# Patient Record
Sex: FEMALE | Race: BLACK OR AFRICAN AMERICAN | HISPANIC OR LATINO | URBAN - METROPOLITAN AREA
[De-identification: names, ages, dates, MRNs, and addresses within clinical notes are randomized per-mention and may not be internally consistent; named-entity substitution may affect disease eponyms.]

---

## 2017-09-08 ENCOUNTER — EMERGENCY (EMERGENCY)
Facility: HOSPITAL | Age: 42
LOS: 1 days | Discharge: HOME | End: 2017-09-08
Attending: EMERGENCY MEDICINE | Admitting: EMERGENCY MEDICINE
Payer: SELF-PAY

## 2017-09-08 VITALS
OXYGEN SATURATION: 100 % | DIASTOLIC BLOOD PRESSURE: 90 MMHG | HEART RATE: 69 BPM | TEMPERATURE: 98 F | SYSTOLIC BLOOD PRESSURE: 163 MMHG | RESPIRATION RATE: 17 BRPM

## 2017-09-08 VITALS
HEART RATE: 87 BPM | OXYGEN SATURATION: 100 % | WEIGHT: 169.98 LBS | SYSTOLIC BLOOD PRESSURE: 165 MMHG | DIASTOLIC BLOOD PRESSURE: 106 MMHG | TEMPERATURE: 99 F | RESPIRATION RATE: 18 BRPM

## 2017-09-08 PROCEDURE — 73010 X-RAY EXAM OF SHOULDER BLADE: CPT

## 2017-09-08 PROCEDURE — 99284 EMERGENCY DEPT VISIT MOD MDM: CPT

## 2017-09-08 PROCEDURE — 73030 X-RAY EXAM OF SHOULDER: CPT | Mod: 26,RT

## 2017-09-08 PROCEDURE — 71020: CPT | Mod: 26

## 2017-09-08 PROCEDURE — 73010 X-RAY EXAM OF SHOULDER BLADE: CPT | Mod: 26

## 2017-09-08 PROCEDURE — 71046 X-RAY EXAM CHEST 2 VIEWS: CPT

## 2017-09-08 PROCEDURE — 73030 X-RAY EXAM OF SHOULDER: CPT

## 2017-09-08 PROCEDURE — 99284 EMERGENCY DEPT VISIT MOD MDM: CPT | Mod: 25

## 2017-09-08 RX ORDER — DIAZEPAM 5 MG
1 TABLET ORAL
Qty: 9 | Refills: 0
Start: 2017-09-08 | End: 2017-09-11

## 2017-09-08 RX ORDER — ACETAMINOPHEN 500 MG
650 TABLET ORAL ONCE
Qty: 0 | Refills: 0 | Status: COMPLETED | OUTPATIENT
Start: 2017-09-08 | End: 2017-09-08

## 2017-09-08 RX ADMIN — Medication 650 MILLIGRAM(S): at 12:10

## 2017-09-08 RX ADMIN — Medication 650 MILLIGRAM(S): at 11:10

## 2017-09-08 NOTE — ED PROVIDER NOTE - PLAN OF CARE
Please follow up with your PMD in 24-48 hours.     Please use 600mg Motrin (also called Advil or ibuprofen) every 6 hours as needed for pain/discomfort/swelling.  You can take this together with Tylenol 650mg  (also called acetaminophen) every 6 hours.  Both of these medications can be obtained over the counter.  Don't use more than 3500mg of Tylenol in any 24-hour period. Make sure your other prescription/over-the-counter medications don't contain any Tylenol so you don't take too much. If you have any stomach discomfort while taking Motrin, you can use TUMS or Pepcid or Zantac (these can all be bought without a prescription).     Please return to the ED should you have any new or worsening symptoms or have any new concerns.     Read all attached. Please follow up with your PMD in 24-48 hours regarding the breast lesion. This will require a follow up study.    Please return to the ED should you have any new or worsening symptoms or have any new concerns.     Please use 600mg Motrin (also called Advil or ibuprofen) every 6 hours as needed for pain/discomfort/swelling.  You can take this together with Tylenol 650mg  (also called acetaminophen) every 6 hours.  Both of these medications can be obtained over the counter.  Don't use more than 3500mg of Tylenol in any 24-hour period. Make sure your other prescription/over-the-counter medications don't contain any Tylenol so you don't take too much. If you have any stomach discomfort while taking Motrin, you can use TUMS or Pepcid or Zantac (these can all be bought without a prescription). You can also use a lidoderm patch for pain.    If your pain is not well controlled, you can take the muscle relaxant for pain. Do NOT drive while on this medication.     Read all attached.   Please use 600mg Motrin (also called Advil or ibuprofen) every 6 hours as needed for pain/discomfort/swelling.  You can take this together with Tylenol 650mg  (also called acetaminophen) every 6 hours.  Both of these medications can be obtained over the counter.  Don't use more than 3500mg of Tylenol in any 24-hour period. Make sure your other prescription/over-the-counter medications don't contain any Tylenol so you don't take too much. If you have any stomach discomfort while taking Motrin, you can use TUMS or Pepcid or Zantac (these can all be bought without a prescription).     Please return to the ED should you have any new or worsening symptoms or have any new concerns.     Read all attached. 1. Please follow up with your PMD in 24-48 hours.   2. Please return to the ED should you have any new or worsening symptoms or have any new concerns.   3. Please use 600mg Motrin (also called Advil or ibuprofen) every 6 hours as needed for pain/discomfort/swelling.  You can take this together with Tylenol 650mg  (also called acetaminophen) every 6 hours.  Both of these medications can be obtained over the counter.  Don't use more than 3500mg of Tylenol in any 24-hour period. Make sure your other prescription/over-the-counter medications don't contain any Tylenol so you don't take too much. If you have any stomach discomfort while taking Motrin, you can use TUMS or Pepcid or Zantac (these can all be bought without a prescription). You can also use a lidoderm patch.   4. Read all attached.

## 2017-09-08 NOTE — ED PROVIDER NOTE - NS ED ROS FT
Yuliana Adhikari, DO: ROS: denies HA, weakness, dizziness, nausea/vomiting, chest pain, SOB, abdominal pain, joint pain, neuro deficits, dysuria/hematuria, rash.

## 2017-09-08 NOTE — ED PROVIDER NOTE - PHYSICAL EXAMINATION
Yuliana Adhikari, DO:   Gen: Well appearing, NAD  Head: NCAT  HEENT: PERRL, MMM, normal conjunctiva, anicteric, neck supple  Lung: CTAB, no adventitious sounds  CV: RRR, no murmurs, rubs or gallops  Abd: soft, NTND, no rebound or guarding, no CVAT  MSK: No edema, full ROM of R shoulder, no visible or palpable  deformities, no spinal TTP   Neuro: No focal neurologic deficits. CN II-XII grossly intact. 5/5 strength and normal sensation in all extremities.  Skin: Warm and dry, no evidence of rash  Psych: normal mood and affect  Heme: radial pulses 2/3 intact
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
## 2017-09-08 NOTE — ED PROVIDER NOTE - CARE PLAN
Instructions for follow-up, activity and diet:	Please follow up with your PMD in 24-48 hours.     Please use 600mg Motrin (also called Advil or ibuprofen) every 6 hours as needed for pain/discomfort/swelling.  You can take this together with Tylenol 650mg  (also called acetaminophen) every 6 hours.  Both of these medications can be obtained over the counter.  Don't use more than 3500mg of Tylenol in any 24-hour period. Make sure your other prescription/over-the-counter medications don't contain any Tylenol so you don't take too much. If you have any stomach discomfort while taking Motrin, you can use TUMS or Pepcid or Zantac (these can all be bought without a prescription).     Please return to the ED should you have any new or worsening symptoms or have any new concerns.     Read all attached. Instructions for follow-up, activity and diet:	Please follow up with your PMD in 24-48 hours regarding the breast lesion. This will require a follow up study.    Please return to the ED should you have any new or worsening symptoms or have any new concerns.     Please use 600mg Motrin (also called Advil or ibuprofen) every 6 hours as needed for pain/discomfort/swelling.  You can take this together with Tylenol 650mg  (also called acetaminophen) every 6 hours.  Both of these medications can be obtained over the counter.  Don't use more than 3500mg of Tylenol in any 24-hour period. Make sure your other prescription/over-the-counter medications don't contain any Tylenol so you don't take too much. If you have any stomach discomfort while taking Motrin, you can use TUMS or Pepcid or Zantac (these can all be bought without a prescription). You can also use a lidoderm patch for pain.    If your pain is not well controlled, you can take the muscle relaxant for pain. Do NOT drive while on this medication.     Read all attached.   Please use 600mg Motrin (also called Advil or ibuprofen) every 6 hours as needed for pain/discomfort/swelling.  You can take this together with Tylenol 650mg  (also called acetaminophen) every 6 hours.  Both of these medications can be obtained over the counter.  Don't use more than 3500mg of Tylenol in any 24-hour period. Make sure your other prescription/over-the-counter medications don't contain any Tylenol so you don't take too much. If you have any stomach discomfort while taking Motrin, you can use TUMS or Pepcid or Zantac (these can all be bought without a prescription).     Please return to the ED should you have any new or worsening symptoms or have any new concerns.     Read all attached. Instructions for follow-up, activity and diet:	1. Please follow up with your PMD in 24-48 hours.   2. Please return to the ED should you have any new or worsening symptoms or have any new concerns.   3. Please use 600mg Motrin (also called Advil or ibuprofen) every 6 hours as needed for pain/discomfort/swelling.  You can take this together with Tylenol 650mg  (also called acetaminophen) every 6 hours.  Both of these medications can be obtained over the counter.  Don't use more than 3500mg of Tylenol in any 24-hour period. Make sure your other prescription/over-the-counter medications don't contain any Tylenol so you don't take too much. If you have any stomach discomfort while taking Motrin, you can use TUMS or Pepcid or Zantac (these can all be bought without a prescription). You can also use a lidoderm patch.   4. Read all attached. Principal Discharge DX:	Contusion of right shoulder, initial encounter  Instructions for follow-up, activity and diet:	1. Please follow up with your PMD in 24-48 hours.   2. Please return to the ED should you have any new or worsening symptoms or have any new concerns.   3. Please use 600mg Motrin (also called Advil or ibuprofen) every 6 hours as needed for pain/discomfort/swelling.  You can take this together with Tylenol 650mg  (also called acetaminophen) every 6 hours.  Both of these medications can be obtained over the counter.  Don't use more than 3500mg of Tylenol in any 24-hour period. Make sure your other prescription/over-the-counter medications don't contain any Tylenol so you don't take too much. If you have any stomach discomfort while taking Motrin, you can use TUMS or Pepcid or Zantac (these can all be bought without a prescription). You can also use a lidoderm patch.   4. Read all attached.
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
## 2019-08-28 PROBLEM — I10 ESSENTIAL (PRIMARY) HYPERTENSION: Chronic | Status: ACTIVE | Noted: 2017-09-08

## 2019-09-18 ENCOUNTER — APPOINTMENT (OUTPATIENT)
Dept: OBGYN | Facility: CLINIC | Age: 44
End: 2019-09-18

## 2019-09-18 PROBLEM — Z00.00 ENCOUNTER FOR PREVENTIVE HEALTH EXAMINATION: Status: ACTIVE | Noted: 2019-09-18

## 2019-10-02 ENCOUNTER — APPOINTMENT (OUTPATIENT)
Dept: OBGYN | Facility: CLINIC | Age: 44
End: 2019-10-02
Payer: COMMERCIAL

## 2019-10-02 VITALS
DIASTOLIC BLOOD PRESSURE: 95 MMHG | SYSTOLIC BLOOD PRESSURE: 149 MMHG | BODY MASS INDEX: 28.51 KG/M2 | WEIGHT: 177.38 LBS | HEART RATE: 69 BPM | HEIGHT: 66 IN

## 2019-10-02 DIAGNOSIS — Z86.79 PERSONAL HISTORY OF OTHER DISEASES OF THE CIRCULATORY SYSTEM: ICD-10-CM

## 2019-10-02 DIAGNOSIS — Z78.9 OTHER SPECIFIED HEALTH STATUS: ICD-10-CM

## 2019-10-02 DIAGNOSIS — Z82.49 FAMILY HISTORY OF ISCHEMIC HEART DISEASE AND OTHER DISEASES OF THE CIRCULATORY SYSTEM: ICD-10-CM

## 2019-10-02 DIAGNOSIS — Z12.39 ENCOUNTER FOR OTHER SCREENING FOR MALIGNANT NEOPLASM OF BREAST: ICD-10-CM

## 2019-10-02 DIAGNOSIS — N97.9 FEMALE INFERTILITY, UNSPECIFIED: ICD-10-CM

## 2019-10-02 PROCEDURE — 99204 OFFICE O/P NEW MOD 45 MIN: CPT

## 2019-10-02 NOTE — COUNSELING
[Breast Self Exam] : breast self exam [Nutrition] : nutrition [Vitamins/Supplements] : vitamins/supplements [Exercise] : exercise [Safe Sexual Practices] : safe sexual practices [STD (testing, results, tx)] : STD (testing, results, tx)

## 2019-10-02 NOTE — PHYSICAL EXAM
[Awake] : awake [Alert] : alert [Acute Distress] : no acute distress [Mass] : no breast mass [Nipple Discharge] : no nipple discharge [Axillary LAD] : no axillary lymphadenopathy [Soft] : soft [Oriented x3] : oriented to person, place, and time [Tender] : non tender [Normal] : uterus [No Bleeding] : there was no active vaginal bleeding [Enlarged ___ wks] : enlarged [unfilled] ~Uweeks [Uterine Adnexae] : were not tender and not enlarged

## 2019-10-02 NOTE — HISTORY OF PRESENT ILLNESS
[Lower-Rt-Q] : lower right quadrant [Suprapubic] : suprapubic area [Lower-Lt-Q] : left lower quadrant [Burning] : no burning [Continuous] : no continuous [Dull] : no dull [Stabbing] : stabbing [Sharp] : sharp [Fever] : no fever [4/10] : is 4/10 in severity [Vomiting] : no vomiting [Nausea] : no nausea [Vaginal Discharge] : no vaginal discharge [Diarrhea] : no diarrhea [Vaginal Bleeding] : vaginal bleeding [Pelvic Pressure] : pelvic pressure [Dysuria] : no dysuria [Dysmenorrhea] : dysmenorrhea [Pain with BMs] : no pain with bowel movements [Heat] : improved by heat [Rest] : improved by rest [Non-opiod Analgesic] : improved by non-opiod analgesic [Emotional Stress] : worsened by emotional stress [Activity] : worsened by activity [[place]] : not worsened by intercourse [Lifting] : worsened by lifting [Menses] : worsened by menses [Movement] : worsened by movement [Early Pregnancy] : not pregnant [Excessive Physical Activity] : no excessive physical activity [Urination] : not worsened by urination [New Sexual Partner] : no new sexual partners [Pelvic Trauma] : no pelvic trauma [STDs] : no sexually transmitted disease [Current IUD] : not currently using an IUD [Previous IUD] : no history of IUD use

## 2019-10-07 ENCOUNTER — FORM ENCOUNTER (OUTPATIENT)
Age: 44
End: 2019-10-07

## 2019-10-08 ENCOUNTER — APPOINTMENT (OUTPATIENT)
Dept: MAMMOGRAPHY | Facility: CLINIC | Age: 44
End: 2019-10-08
Payer: COMMERCIAL

## 2019-10-08 ENCOUNTER — OUTPATIENT (OUTPATIENT)
Dept: OUTPATIENT SERVICES | Facility: HOSPITAL | Age: 44
LOS: 1 days | End: 2019-10-08
Payer: COMMERCIAL

## 2019-10-08 DIAGNOSIS — Z12.31 ENCOUNTER FOR SCREENING MAMMOGRAM FOR MALIGNANT NEOPLASM OF BREAST: ICD-10-CM

## 2019-10-08 PROCEDURE — 77063 BREAST TOMOSYNTHESIS BI: CPT

## 2019-10-08 PROCEDURE — 77067 SCR MAMMO BI INCL CAD: CPT | Mod: 26

## 2019-10-08 PROCEDURE — 77067 SCR MAMMO BI INCL CAD: CPT

## 2019-10-08 PROCEDURE — 77063 BREAST TOMOSYNTHESIS BI: CPT | Mod: 26

## 2019-10-09 DIAGNOSIS — N63.20 UNSPECIFIED LUMP IN THE LEFT BREAST, UNSPECIFIED QUADRANT: ICD-10-CM

## 2019-10-09 DIAGNOSIS — R92.2 INCONCLUSIVE MAMMOGRAM: ICD-10-CM

## 2019-10-09 LAB
BASOPHILS # BLD AUTO: 0.04 K/UL
BASOPHILS NFR BLD AUTO: 0.8 %
C TRACH RRNA SPEC QL NAA+PROBE: NOT DETECTED
CYTOLOGY CVX/VAG DOC THIN PREP: NORMAL
EOSINOPHIL # BLD AUTO: 0.16 K/UL
EOSINOPHIL NFR BLD AUTO: 3.2 %
ESTIMATED AVERAGE GLUCOSE: 103 MG/DL
FSH SERPL-MCNC: 9.5 IU/L
HBA1C MFR BLD HPLC: 5.2 %
HCG SERPL QL: NEGATIVE
HCT VFR BLD CALC: 31.8 %
HGB BLD-MCNC: 9.9 G/DL
HPV DNA HIGH RISK: NEGATIVE
HPV DNA LOW RISK: NEGATIVE
IMM GRANULOCYTES NFR BLD AUTO: 0.2 %
LH SERPL-ACNC: 7.5 IU/L
LYMPHOCYTES # BLD AUTO: 2.26 K/UL
LYMPHOCYTES NFR BLD AUTO: 45.2 %
MAN DIFF?: NORMAL
MCHC RBC-ENTMCNC: 25.3 PG
MCHC RBC-ENTMCNC: 31.1 GM/DL
MCV RBC AUTO: 81.3 FL
MONOCYTES # BLD AUTO: 0.39 K/UL
MONOCYTES NFR BLD AUTO: 7.8 %
N GONORRHOEA RRNA SPEC QL NAA+PROBE: NOT DETECTED
NEUTROPHILS # BLD AUTO: 2.14 K/UL
NEUTROPHILS NFR BLD AUTO: 42.8 %
PAPP-A SERPL-ACNC: <1 MIU/ML
PLATELET # BLD AUTO: 396 K/UL
PROLACTIN SERPL-MCNC: 9.9 NG/ML
RBC # BLD: 3.91 M/UL
RBC # FLD: 16.2 %
SOURCE TP AMPLIFICATION: NORMAL
T3FREE SERPL-MCNC: 3.48 PG/ML
T4 FREE SERPL-MCNC: 1.2 NG/DL
TESTOST SERPL-MCNC: 14.4 NG/DL
TSH SERPL-ACNC: 1.8 UIU/ML
WBC # FLD AUTO: 5 K/UL

## 2019-10-17 ENCOUNTER — ASOB RESULT (OUTPATIENT)
Age: 44
End: 2019-10-17

## 2019-10-17 ENCOUNTER — APPOINTMENT (OUTPATIENT)
Dept: ANTEPARTUM | Facility: CLINIC | Age: 44
End: 2019-10-17
Payer: COMMERCIAL

## 2019-10-17 PROCEDURE — 76830 TRANSVAGINAL US NON-OB: CPT

## 2019-10-17 PROCEDURE — 76856 US EXAM PELVIC COMPLETE: CPT | Mod: 59

## 2019-10-27 ENCOUNTER — FORM ENCOUNTER (OUTPATIENT)
Age: 44
End: 2019-10-27

## 2019-10-28 ENCOUNTER — OUTPATIENT (OUTPATIENT)
Dept: OUTPATIENT SERVICES | Facility: HOSPITAL | Age: 44
LOS: 1 days | End: 2019-10-28
Payer: COMMERCIAL

## 2019-10-28 ENCOUNTER — APPOINTMENT (OUTPATIENT)
Dept: ULTRASOUND IMAGING | Facility: IMAGING CENTER | Age: 44
End: 2019-10-28
Payer: COMMERCIAL

## 2019-10-28 DIAGNOSIS — Z00.8 ENCOUNTER FOR OTHER GENERAL EXAMINATION: ICD-10-CM

## 2019-10-28 PROCEDURE — 76642 ULTRASOUND BREAST LIMITED: CPT

## 2019-10-28 PROCEDURE — 76642 ULTRASOUND BREAST LIMITED: CPT | Mod: 26,LT

## 2019-11-14 ENCOUNTER — APPOINTMENT (OUTPATIENT)
Dept: OBGYN | Facility: CLINIC | Age: 44
End: 2019-11-14
Payer: COMMERCIAL

## 2019-11-14 VITALS
HEART RATE: 82 BPM | SYSTOLIC BLOOD PRESSURE: 167 MMHG | WEIGHT: 178.13 LBS | BODY MASS INDEX: 28.75 KG/M2 | DIASTOLIC BLOOD PRESSURE: 97 MMHG

## 2019-11-14 PROCEDURE — 58558Z: CUSTOM

## 2019-11-14 NOTE — PROCEDURE
[Endometrial Biopsy] : Endometrial biopsy [Irregular Bleeding] : irregular uterine bleeding [Risks] : risks [Benefits] : benefits [Alternatives] : alternatives [Patient] : patient [Bleeding] : bleeding [Infection] : infection [Allergic Reaction] : allergic reaction [Uterine Perforation] : uterine perforation [Pain] : pain [CONSENT OBTAINED] : written consent was obtained prior to the procedure. [LMP ___] : LMP was [unfilled] [Neg Pregnancy Test] : a pregnancy test was negative [Betadine] : Betadine [Paracervical Block] : A paracervical block was performed using [Tenaculum] : a single toothed tenaculum [Without Epi] : without epinephrine [1%] : 1% [Sounded to ___ cm] : sounded to [unfilled] ~Ucm [Required Dilation] : required dilation [Anteverted] : anteverted [Pipelle] : a Pipelle endometrial suction curette [Sent to Histology] : the specimen was place in buffered formalin and sent for pathlogy [Moderate] : a moderate [Tolerated Well] : the patient tolerated the procedure well [No Complications] : there were no complications

## 2019-11-20 LAB — CORE LAB BIOPSY: NORMAL

## 2019-12-12 ENCOUNTER — APPOINTMENT (OUTPATIENT)
Dept: OBGYN | Facility: CLINIC | Age: 44
End: 2019-12-12
Payer: COMMERCIAL

## 2019-12-12 DIAGNOSIS — D25.0 INTRAMURAL LEIOMYOMA OF UTERUS: ICD-10-CM

## 2019-12-12 DIAGNOSIS — D25.2 INTRAMURAL LEIOMYOMA OF UTERUS: ICD-10-CM

## 2019-12-12 DIAGNOSIS — N92.0 EXCESSIVE AND FREQUENT MENSTRUATION WITH REGULAR CYCLE: ICD-10-CM

## 2019-12-12 DIAGNOSIS — D25.1 INTRAMURAL LEIOMYOMA OF UTERUS: ICD-10-CM

## 2019-12-12 PROCEDURE — 99214 OFFICE O/P EST MOD 30 MIN: CPT

## 2020-01-28 ENCOUNTER — OUTPATIENT (OUTPATIENT)
Dept: OUTPATIENT SERVICES | Facility: HOSPITAL | Age: 45
LOS: 1 days | End: 2020-01-28
Payer: COMMERCIAL

## 2020-01-28 VITALS
HEART RATE: 67 BPM | WEIGHT: 176.15 LBS | SYSTOLIC BLOOD PRESSURE: 167 MMHG | HEIGHT: 66 IN | RESPIRATION RATE: 16 BRPM | TEMPERATURE: 97 F | DIASTOLIC BLOOD PRESSURE: 98 MMHG

## 2020-01-28 DIAGNOSIS — Z29.9 ENCOUNTER FOR PROPHYLACTIC MEASURES, UNSPECIFIED: ICD-10-CM

## 2020-01-28 DIAGNOSIS — I10 ESSENTIAL (PRIMARY) HYPERTENSION: ICD-10-CM

## 2020-01-28 DIAGNOSIS — Z01.818 ENCOUNTER FOR OTHER PREPROCEDURAL EXAMINATION: ICD-10-CM

## 2020-01-28 DIAGNOSIS — D25.1 INTRAMURAL LEIOMYOMA OF UTERUS: ICD-10-CM

## 2020-01-28 LAB
ANION GAP SERPL CALC-SCNC: 12 MMOL/L — SIGNIFICANT CHANGE UP (ref 5–17)
BLD GP AB SCN SERPL QL: SIGNIFICANT CHANGE UP
BUN SERPL-MCNC: 10 MG/DL — SIGNIFICANT CHANGE UP (ref 8–20)
CALCIUM SERPL-MCNC: 9.7 MG/DL — SIGNIFICANT CHANGE UP (ref 8.6–10.2)
CHLORIDE SERPL-SCNC: 102 MMOL/L — SIGNIFICANT CHANGE UP (ref 98–107)
CO2 SERPL-SCNC: 25 MMOL/L — SIGNIFICANT CHANGE UP (ref 22–29)
CREAT SERPL-MCNC: 0.91 MG/DL — SIGNIFICANT CHANGE UP (ref 0.5–1.3)
GLUCOSE SERPL-MCNC: 91 MG/DL — SIGNIFICANT CHANGE UP (ref 70–99)
HBA1C BLD-MCNC: 4.8 % — SIGNIFICANT CHANGE UP (ref 4–5.6)
HCT VFR BLD CALC: 37.1 % — SIGNIFICANT CHANGE UP (ref 34.5–45)
HGB BLD-MCNC: 11.8 G/DL — SIGNIFICANT CHANGE UP (ref 11.5–15.5)
MCHC RBC-ENTMCNC: 27 PG — SIGNIFICANT CHANGE UP (ref 27–34)
MCHC RBC-ENTMCNC: 31.8 GM/DL — LOW (ref 32–36)
MCV RBC AUTO: 84.9 FL — SIGNIFICANT CHANGE UP (ref 80–100)
PLATELET # BLD AUTO: 281 K/UL — SIGNIFICANT CHANGE UP (ref 150–400)
POTASSIUM SERPL-MCNC: 4.7 MMOL/L — SIGNIFICANT CHANGE UP (ref 3.5–5.3)
POTASSIUM SERPL-SCNC: 4.7 MMOL/L — SIGNIFICANT CHANGE UP (ref 3.5–5.3)
RBC # BLD: 4.37 M/UL — SIGNIFICANT CHANGE UP (ref 3.8–5.2)
RBC # FLD: 18.5 % — HIGH (ref 10.3–14.5)
SODIUM SERPL-SCNC: 139 MMOL/L — SIGNIFICANT CHANGE UP (ref 135–145)
WBC # BLD: 3.13 K/UL — LOW (ref 3.8–10.5)
WBC # FLD AUTO: 3.13 K/UL — LOW (ref 3.8–10.5)

## 2020-01-28 PROCEDURE — 93005 ELECTROCARDIOGRAM TRACING: CPT

## 2020-01-28 PROCEDURE — G0463: CPT

## 2020-01-28 PROCEDURE — 93010 ELECTROCARDIOGRAM REPORT: CPT

## 2020-01-28 RX ORDER — CEFOTETAN DISODIUM 1 G
2 VIAL (EA) INJECTION ONCE
Refills: 0 | Status: COMPLETED | OUTPATIENT
Start: 2020-02-11 | End: 2020-02-11

## 2020-01-28 RX ORDER — ATENOLOL 25 MG/1
0 TABLET ORAL
Qty: 0 | Refills: 0 | DISCHARGE

## 2020-01-28 RX ORDER — SODIUM CHLORIDE 9 MG/ML
3 INJECTION INTRAMUSCULAR; INTRAVENOUS; SUBCUTANEOUS EVERY 8 HOURS
Refills: 0 | Status: DISCONTINUED | OUTPATIENT
Start: 2020-02-11 | End: 2020-02-13
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
## 2020-01-28 NOTE — H&P PST ADULT - NSICDXPROBLEM_GEN_ALL_CORE_FT
PROBLEM DIAGNOSES  Problem: Need for prophylactic measure  Assessment and Plan: Caprini Score 4 Moderate Risk, surgical team should consider VTE prophylaxis     Problem: Hypertension  Assessment and Plan: continue medications as instructed. Asked the patient to take the Blood pressure medication/ heart medication on DOP.     Problem: Fibroids, intramural  Assessment and Plan: Exploratory laparotomy abdominal myomectomies. Medical Clearance pending

## 2020-01-28 NOTE — H&P PST ADULT - HISTORY OF PRESENT ILLNESS
45 year old female with fibroids, who is scheduled for Exploratory laparotomy, she states that she has heavy menstrual cycle and has pain  for the last few months

## 2020-01-28 NOTE — H&P PST ADULT - ASSESSMENT
medications reviewed, instructions given on what medications to take and what not to take. Asked the patient to take the Blood pressure medication/ heart medication on DOP.   CAPRINI VTE 2.0 SCORE [CLOT updated 2019]    AGE RELATED RISK FACTORS                                                       MOBILITY RELATED FACTORS  [ ] Age 41-60 years                                            (1 Point)                    [ ] Bed rest                                                        (1 Point)  [ ] Age: 61-74 years                                           (2 Points)                  [ ] Plaster cast                                                   (2 Points)  [ ] Age= 75 years                                              (3 Points)                    [ ] Bed bound for more than 72 hours                 (2 Points)    DISEASE RELATED RISK FACTORS                                               GENDER SPECIFIC FACTORS  [ ] Edema in the lower extremities                       (1 Point)              [ ] Pregnancy                                                     (1 Point)  [ ] Varicose veins                                               (1 Point)                     [ ] Post-partum < 6 weeks                                   (1 Point)             [ ] BMI > 25 Kg/m2                                            (1 Point)                     [ ] Hormonal therapy  or oral contraception          (1 Point)                 [ ] Sepsis (in the previous month)                        (1 Point)               [ ] History of pregnancy complications                 (1 point)  [ ] Pneumonia or serious lung disease                                               [ ] Unexplained or recurrent                     (1 Point)           (in the previous month)                               (1 Point)  [ ] Abnormal pulmonary function test                     (1 Point)                 SURGERY RELATED RISK FACTORS  [ ] Acute myocardial infarction                              (1 Point)               [ ]  Section                                             (1 Point)  [ ] Congestive heart failure (in the previous month)  (1 Point)      [ ] Minor surgery                                                  (1 Point)   [ ] Inflammatory bowel disease                             (1 Point)               [ ] Arthroscopic surgery                                        (2 Points)  [ ] Central venous access                                      (2 Points)                [ ] General surgery lasting more than 45 minutes (2 points)  [ ] Malignancy- Present or previous                   (2 Points)                [ ] Elective arthroplasty                                         (5 points)    [ ] Stroke (in the previous month)                          (5 Points)                                                                                                                                                           HEMATOLOGY RELATED FACTORS                                                 TRAUMA RELATED RISK FACTORS  [ ] Prior episodes of VTE                                     (3 Points)                [ ] Fracture of the hip, pelvis, or leg                       (5 Points)  [ ] Positive family history for VTE                         (3 Points)             [ ] Acute spinal cord injury (in the previous month)  (5 Points)  [ ] Prothrombin 50543 A                                     (3 Points)               [ ] Paralysis  (less than 1 month)                             (5 Points)  [ ] Factor V Leiden                                             (3 Points)                  [ ] Multiple Trauma within 1 month                        (5 Points)  [ ] Lupus anticoagulants                                     (3 Points)                                                           [ ] Anticardiolipin antibodies                               (3 Points)                                                       [ ] High homocysteine in the blood                      (3 Points)                                             [ ] Other congenital or acquired thrombophilia      (3 Points)                                                [ ] Heparin induced thrombocytopenia                  (3 Points)                                     Total Score [          ]  OPIOID RISK TOOL    SHRUTHI EACH BOX THAT APPLIES AND ADD TOTALS AT THE END    FAMILY HISTORY OF SUBSTANCE ABUSE                 FEMALE         MALE                                                Alcohol                             [  ]1 pt          [  ]3pts                                               Illegal Drugs                     [  ]2 pts        [  ]3pts                                               Rx Drugs                           [  ]4 pts        [  ]4 pts    PERSONAL HISTORY OF SUBSTANCE ABUSE                                                                                          Alcohol                             [  ]3 pts       [  ]3 pts                                               Illegal Drugs                     [  ]4 pts        [  ]4 pts                                               Rx Drugs                           [  ]5 pts        [  ]5 pts    AGE BETWEEN 16-45 YEARS                                      [  ]1 pt         [  ]1 pt    HISTORY OF PREADOLESCENT   SEXUAL ABUSE                                                             [  ]3 pts        [  ]0pts    PSYCHOLOGICAL DISEASE                     ADD, OCD, Bipolar, Schizophrenia        [  ]2 pts         [  ]2 pts                      Depression                                               [  ]1 pt           [  ]1 pt           SCORING TOTAL   (add numbers and type here)              (  0)                                     A score of 3 or lower indicated LOW risk for future opioid abuse  A score of 4 to 7 indicated moderate risk for future opioid abuse  A score of 8 or higher indicates a high risk for opioid abuse medications reviewed, instructions given on what medications to take and what not to take. Asked the patient to take the Blood pressure medication/ heart medication on DOP.   CAPRINI VTE 2.0 SCORE [CLOT updated 2019]    AGE RELATED RISK FACTORS                                                       MOBILITY RELATED FACTORS  [x ] Age 41-60 years                                            (1 Point)                    [ ] Bed rest                                                        (1 Point)  [ ] Age: 61-74 years                                           (2 Points)                  [ ] Plaster cast                                                   (2 Points)  [ ] Age= 75 years                                              (3 Points)                    [ ] Bed bound for more than 72 hours                 (2 Points)    DISEASE RELATED RISK FACTORS                                               GENDER SPECIFIC FACTORS  [ ] Edema in the lower extremities                       (1 Point)              [ ] Pregnancy                                                     (1 Point)  [ ] Varicose veins                                               (1 Point)                     [ ] Post-partum < 6 weeks                                   (1 Point)             [x ] BMI > 25 Kg/m2                                            (1 Point)                     [ ] Hormonal therapy  or oral contraception          (1 Point)                 [ ] Sepsis (in the previous month)                        (1 Point)               [ ] History of pregnancy complications                 (1 point)  [ ] Pneumonia or serious lung disease                                               [ ] Unexplained or recurrent                     (1 Point)           (in the previous month)                               (1 Point)  [ ] Abnormal pulmonary function test                     (1 Point)                 SURGERY RELATED RISK FACTORS  [ ] Acute myocardial infarction                              (1 Point)               [ ]  Section                                             (1 Point)  [ ] Congestive heart failure (in the previous month)  (1 Point)      [ ] Minor surgery                                                  (1 Point)   [ ] Inflammatory bowel disease                             (1 Point)               [ ] Arthroscopic surgery                                        (2 Points)  [ ] Central venous access                                      (2 Points)                [x ] General surgery lasting more than 45 minutes (2 points)  [ ] Malignancy- Present or previous                   (2 Points)                [ ] Elective arthroplasty                                         (5 points)    [ ] Stroke (in the previous month)                          (5 Points)                                                                                                                                                           HEMATOLOGY RELATED FACTORS                                                 TRAUMA RELATED RISK FACTORS  [ ] Prior episodes of VTE                                     (3 Points)                [ ] Fracture of the hip, pelvis, or leg                       (5 Points)  [ ] Positive family history for VTE                         (3 Points)             [ ] Acute spinal cord injury (in the previous month)  (5 Points)  [ ] Prothrombin 94188 A                                     (3 Points)               [ ] Paralysis  (less than 1 month)                             (5 Points)  [ ] Factor V Leiden                                             (3 Points)                  [ ] Multiple Trauma within 1 month                        (5 Points)  [ ] Lupus anticoagulants                                     (3 Points)                                                           [ ] Anticardiolipin antibodies                               (3 Points)                                                       [ ] High homocysteine in the blood                      (3 Points)                                             [ ] Other congenital or acquired thrombophilia      (3 Points)                                                [ ] Heparin induced thrombocytopenia                  (3 Points)                                     Total Score [    4      ]  OPIOID RISK TOOL    SHRUTHI EACH BOX THAT APPLIES AND ADD TOTALS AT THE END    FAMILY HISTORY OF SUBSTANCE ABUSE                 FEMALE         MALE                                                Alcohol                             [  ]1 pt          [  ]3pts                                               Illegal Drugs                     [  ]2 pts        [  ]3pts                                               Rx Drugs                           [  ]4 pts        [  ]4 pts    PERSONAL HISTORY OF SUBSTANCE ABUSE                                                                                          Alcohol                             [  ]3 pts       [  ]3 pts                                               Illegal Drugs                     [  ]4 pts        [  ]4 pts                                               Rx Drugs                           [  ]5 pts        [  ]5 pts    AGE BETWEEN 16-45 YEARS                                      [  ]1 pt         [  ]1 pt    HISTORY OF PREADOLESCENT   SEXUAL ABUSE                                                             [  ]3 pts        [  ]0pts    PSYCHOLOGICAL DISEASE                     ADD, OCD, Bipolar, Schizophrenia        [  ]2 pts         [  ]2 pts                      Depression                                               [  ]1 pt           [  ]1 pt           SCORING TOTAL   (add numbers and type here)              (  0)                                     A score of 3 or lower indicated LOW risk for future opioid abuse  A score of 4 to 7 indicated moderate risk for future opioid abuse  A score of 8 or higher indicates a high risk for opioid abuse

## 2020-02-10 ENCOUNTER — TRANSCRIPTION ENCOUNTER (OUTPATIENT)
Age: 45
End: 2020-02-10

## 2020-02-11 ENCOUNTER — INPATIENT (INPATIENT)
Facility: HOSPITAL | Age: 45
LOS: 1 days | Discharge: HOME | DRG: 743 | End: 2020-02-13
Attending: OBSTETRICS & GYNECOLOGY | Admitting: OBSTETRICS & GYNECOLOGY
Payer: COMMERCIAL

## 2020-02-11 ENCOUNTER — RESULT REVIEW (OUTPATIENT)
Age: 45
End: 2020-02-11

## 2020-02-11 ENCOUNTER — APPOINTMENT (OUTPATIENT)
Dept: OBGYN | Facility: HOSPITAL | Age: 45
End: 2020-02-11

## 2020-02-11 VITALS
TEMPERATURE: 99 F | HEART RATE: 85 BPM | DIASTOLIC BLOOD PRESSURE: 97 MMHG | HEIGHT: 66 IN | SYSTOLIC BLOOD PRESSURE: 167 MMHG | WEIGHT: 176.15 LBS | OXYGEN SATURATION: 100 % | RESPIRATION RATE: 16 BRPM

## 2020-02-11 DIAGNOSIS — D25.1 INTRAMURAL LEIOMYOMA OF UTERUS: ICD-10-CM

## 2020-02-11 LAB
ABO RH CONFIRMATION: SIGNIFICANT CHANGE UP
ANISOCYTOSIS BLD QL: SLIGHT — SIGNIFICANT CHANGE UP
BASOPHILS # BLD AUTO: 0 K/UL — SIGNIFICANT CHANGE UP (ref 0–0.2)
BASOPHILS NFR BLD AUTO: 0 % — SIGNIFICANT CHANGE UP (ref 0–2)
ELLIPTOCYTES BLD QL SMEAR: SLIGHT — SIGNIFICANT CHANGE UP
EOSINOPHIL # BLD AUTO: 0 K/UL — SIGNIFICANT CHANGE UP (ref 0–0.5)
EOSINOPHIL NFR BLD AUTO: 0 % — SIGNIFICANT CHANGE UP (ref 0–6)
GLUCOSE BLDC GLUCOMTR-MCNC: 94 MG/DL — SIGNIFICANT CHANGE UP (ref 70–99)
HCT VFR BLD CALC: 32.3 % — LOW (ref 34.5–45)
HGB BLD-MCNC: 10.8 G/DL — LOW (ref 11.5–15.5)
LYMPHOCYTES # BLD AUTO: 0.85 K/UL — LOW (ref 1–3.3)
LYMPHOCYTES # BLD AUTO: 5.2 % — LOW (ref 13–44)
MACROCYTES BLD QL: SLIGHT — SIGNIFICANT CHANGE UP
MANUAL SMEAR VERIFICATION: SIGNIFICANT CHANGE UP
MCHC RBC-ENTMCNC: 28.8 PG — SIGNIFICANT CHANGE UP (ref 27–34)
MCHC RBC-ENTMCNC: 33.4 GM/DL — SIGNIFICANT CHANGE UP (ref 32–36)
MCV RBC AUTO: 86.1 FL — SIGNIFICANT CHANGE UP (ref 80–100)
MONOCYTES # BLD AUTO: 0.85 K/UL — SIGNIFICANT CHANGE UP (ref 0–0.9)
MONOCYTES NFR BLD AUTO: 5.2 % — SIGNIFICANT CHANGE UP (ref 2–14)
NEUTROPHILS # BLD AUTO: 14.63 K/UL — HIGH (ref 1.8–7.4)
NEUTROPHILS NFR BLD AUTO: 87.8 % — HIGH (ref 43–77)
NEUTS BAND # BLD: 1.8 % — SIGNIFICANT CHANGE UP (ref 0–8)
PLAT MORPH BLD: NORMAL — SIGNIFICANT CHANGE UP
PLATELET # BLD AUTO: 178 K/UL — SIGNIFICANT CHANGE UP (ref 150–400)
POIKILOCYTOSIS BLD QL AUTO: SLIGHT — SIGNIFICANT CHANGE UP
RBC # BLD: 3.75 M/UL — LOW (ref 3.8–5.2)
RBC # FLD: 16.2 % — HIGH (ref 10.3–14.5)
RBC BLD AUTO: ABNORMAL
WBC # BLD: 16.33 K/UL — HIGH (ref 3.8–10.5)
WBC # FLD AUTO: 16.33 K/UL — HIGH (ref 3.8–10.5)

## 2020-02-11 PROCEDURE — 88305 TISSUE EXAM BY PATHOLOGIST: CPT | Mod: 26

## 2020-02-11 PROCEDURE — 58546 LAPARO-MYOMECTOMY COMPLEX: CPT

## 2020-02-11 RX ORDER — ONDANSETRON 8 MG/1
4 TABLET, FILM COATED ORAL EVERY 4 HOURS
Refills: 0 | Status: DISCONTINUED | OUTPATIENT
Start: 2020-02-11 | End: 2020-02-13

## 2020-02-11 RX ORDER — HYDROMORPHONE HYDROCHLORIDE 2 MG/ML
0.5 INJECTION INTRAMUSCULAR; INTRAVENOUS; SUBCUTANEOUS
Refills: 0 | Status: DISCONTINUED | OUTPATIENT
Start: 2020-02-11 | End: 2020-02-11

## 2020-02-11 RX ORDER — OXYCODONE AND ACETAMINOPHEN 5; 325 MG/1; MG/1
2 TABLET ORAL EVERY 4 HOURS
Refills: 0 | Status: DISCONTINUED | OUTPATIENT
Start: 2020-02-11 | End: 2020-02-13

## 2020-02-11 RX ORDER — SENNA PLUS 8.6 MG/1
2 TABLET ORAL AT BEDTIME
Refills: 0 | Status: DISCONTINUED | OUTPATIENT
Start: 2020-02-11 | End: 2020-02-13

## 2020-02-11 RX ORDER — KETOROLAC TROMETHAMINE 30 MG/ML
30 SYRINGE (ML) INJECTION EVERY 6 HOURS
Refills: 0 | Status: DISCONTINUED | OUTPATIENT
Start: 2020-02-11 | End: 2020-02-12

## 2020-02-11 RX ORDER — ONDANSETRON 8 MG/1
4 TABLET, FILM COATED ORAL ONCE
Refills: 0 | Status: DISCONTINUED | OUTPATIENT
Start: 2020-02-11 | End: 2020-02-11

## 2020-02-11 RX ORDER — ATENOLOL 25 MG/1
100 TABLET ORAL DAILY
Refills: 0 | Status: DISCONTINUED | OUTPATIENT
Start: 2020-02-11 | End: 2020-02-13

## 2020-02-11 RX ORDER — SODIUM CHLORIDE 9 MG/ML
1000 INJECTION, SOLUTION INTRAVENOUS
Refills: 0 | Status: DISCONTINUED | OUTPATIENT
Start: 2020-02-11 | End: 2020-02-11

## 2020-02-11 RX ORDER — SODIUM CHLORIDE 9 MG/ML
1000 INJECTION, SOLUTION INTRAVENOUS
Refills: 0 | Status: DISCONTINUED | OUTPATIENT
Start: 2020-02-11 | End: 2020-02-12

## 2020-02-11 RX ORDER — CEFAZOLIN SODIUM 1 G
2000 VIAL (EA) INJECTION EVERY 8 HOURS
Refills: 0 | Status: COMPLETED | OUTPATIENT
Start: 2020-02-11 | End: 2020-02-12

## 2020-02-11 RX ORDER — OXYCODONE AND ACETAMINOPHEN 5; 325 MG/1; MG/1
1 TABLET ORAL EVERY 4 HOURS
Refills: 0 | Status: DISCONTINUED | OUTPATIENT
Start: 2020-02-11 | End: 2020-02-13

## 2020-02-11 RX ORDER — FENTANYL CITRATE 50 UG/ML
30 INJECTION INTRAVENOUS
Refills: 0 | Status: DISCONTINUED | OUTPATIENT
Start: 2020-02-11 | End: 2020-02-12

## 2020-02-11 RX ADMIN — HYDROMORPHONE HYDROCHLORIDE 0.5 MILLIGRAM(S): 2 INJECTION INTRAMUSCULAR; INTRAVENOUS; SUBCUTANEOUS at 15:05

## 2020-02-11 RX ADMIN — SODIUM CHLORIDE 3 MILLILITER(S): 9 INJECTION INTRAMUSCULAR; INTRAVENOUS; SUBCUTANEOUS at 20:30

## 2020-02-11 RX ADMIN — Medication 100 GRAM(S): at 12:35

## 2020-02-11 RX ADMIN — Medication 30 MILLIGRAM(S): at 18:04

## 2020-02-11 RX ADMIN — FENTANYL CITRATE 30 MILLILITER(S): 50 INJECTION INTRAVENOUS at 14:35

## 2020-02-11 RX ADMIN — FENTANYL CITRATE 30 MILLILITER(S): 50 INJECTION INTRAVENOUS at 16:21

## 2020-02-11 RX ADMIN — Medication 100 MILLIGRAM(S): at 20:28

## 2020-02-11 RX ADMIN — HYDROMORPHONE HYDROCHLORIDE 0.5 MILLIGRAM(S): 2 INJECTION INTRAMUSCULAR; INTRAVENOUS; SUBCUTANEOUS at 14:35

## 2020-02-11 RX ADMIN — FENTANYL CITRATE 30 MILLILITER(S): 50 INJECTION INTRAVENOUS at 17:06

## 2020-02-11 NOTE — BRIEF OPERATIVE NOTE - NSICDXBRIEFPROCEDURE_GEN_ALL_CORE_FT
PROCEDURES:  Myomectomy, uterine, abdominal approach, 5 or more leiomyomata, total weight greater than 250 grams 11-Feb-2020 15:55:58  Garcia Goldberg

## 2020-02-11 NOTE — CHART NOTE - NSCHARTNOTEFT_GEN_A_CORE
Patient seen and examined at bedside for post-operative check. She is s/p abdominal myomectomy. She received 2 units of packed red blood cells intraoperatively due to blood loss. She states that she feels ok at this time. She reports good pain management with the PCA. She denies nausea and vomiting. She has not yet attempted PO fluids or food.    Vital Signs Last 24 Hrs  T(C): 36.6 (11 Feb 2020 16:49), Max: 37.2 (11 Feb 2020 11:02)  T(F): 97.9 (11 Feb 2020 16:49), Max: 99 (11 Feb 2020 11:02)  HR: 75 (11 Feb 2020 16:49) (64 - 85)  BP: 115/79 (11 Feb 2020 16:49) (109/81 - 185/91)  BP(mean): 85 (11 Feb 2020 15:40) (85 - 123)  RR: 16 (11 Feb 2020 16:49) (12 - 18)  SpO2: 100% (11 Feb 2020 16:49) (99% - 100%)    Gen: NAD, AOx3, sleepy  CV: RRR  Pulm: CTAB  Abd: soft, nondistended, appropriately tender, bandage clean, dry and intact  : vaginal bleeding noted, schneider catheter in place draining clear yellow urine  Ext: SCDs in place, no edema noted    - Patient doing well post-operatively  - PCA and toradol for pain control, with percocet for breakthrough pain  - Zofran prn for nausea  - Post-operative/ post-transfusion CBC scheduled for 1800  - Repeat labs in AM  - Schneider to come out in AM  - Continue routine post-operative care

## 2020-02-12 ENCOUNTER — TRANSCRIPTION ENCOUNTER (OUTPATIENT)
Age: 45
End: 2020-02-12

## 2020-02-12 DIAGNOSIS — Z98.890 OTHER SPECIFIED POSTPROCEDURAL STATES: ICD-10-CM

## 2020-02-12 LAB
ANISOCYTOSIS BLD QL: SLIGHT — SIGNIFICANT CHANGE UP
BASOPHILS # BLD AUTO: 0 K/UL — SIGNIFICANT CHANGE UP (ref 0–0.2)
BASOPHILS NFR BLD AUTO: 0 % — SIGNIFICANT CHANGE UP (ref 0–2)
EOSINOPHIL # BLD AUTO: 0 K/UL — SIGNIFICANT CHANGE UP (ref 0–0.5)
EOSINOPHIL NFR BLD AUTO: 0 % — SIGNIFICANT CHANGE UP (ref 0–6)
HCT VFR BLD CALC: 27 % — LOW (ref 34.5–45)
HGB BLD-MCNC: 8.9 G/DL — LOW (ref 11.5–15.5)
HYPOCHROMIA BLD QL: SLIGHT — SIGNIFICANT CHANGE UP
LYMPHOCYTES # BLD AUTO: 0.6 K/UL — LOW (ref 1–3.3)
LYMPHOCYTES # BLD AUTO: 3.5 % — LOW (ref 13–44)
MANUAL SMEAR VERIFICATION: SIGNIFICANT CHANGE UP
MCHC RBC-ENTMCNC: 28.2 PG — SIGNIFICANT CHANGE UP (ref 27–34)
MCHC RBC-ENTMCNC: 33 GM/DL — SIGNIFICANT CHANGE UP (ref 32–36)
MCV RBC AUTO: 85.4 FL — SIGNIFICANT CHANGE UP (ref 80–100)
MONOCYTES # BLD AUTO: 0.73 K/UL — SIGNIFICANT CHANGE UP (ref 0–0.9)
MONOCYTES NFR BLD AUTO: 4.3 % — SIGNIFICANT CHANGE UP (ref 2–14)
NEUTROPHILS # BLD AUTO: 15.7 K/UL — HIGH (ref 1.8–7.4)
NEUTROPHILS NFR BLD AUTO: 80.9 % — HIGH (ref 43–77)
NEUTS BAND # BLD: 11.3 % — HIGH (ref 0–8)
OVALOCYTES BLD QL SMEAR: SLIGHT — SIGNIFICANT CHANGE UP
PLAT MORPH BLD: NORMAL — SIGNIFICANT CHANGE UP
PLATELET # BLD AUTO: 169 K/UL — SIGNIFICANT CHANGE UP (ref 150–400)
POIKILOCYTOSIS BLD QL AUTO: SLIGHT — SIGNIFICANT CHANGE UP
POLYCHROMASIA BLD QL SMEAR: SLIGHT — SIGNIFICANT CHANGE UP
RBC # BLD: 3.16 M/UL — LOW (ref 3.8–5.2)
RBC # FLD: 16.3 % — HIGH (ref 10.3–14.5)
RBC BLD AUTO: ABNORMAL
SCHISTOCYTES BLD QL AUTO: SLIGHT — SIGNIFICANT CHANGE UP
WBC # BLD: 17.03 K/UL — HIGH (ref 3.8–10.5)
WBC # FLD AUTO: 17.03 K/UL — HIGH (ref 3.8–10.5)

## 2020-02-12 RX ORDER — IBUPROFEN 200 MG
1 TABLET ORAL
Qty: 28 | Refills: 0
Start: 2020-02-12

## 2020-02-12 RX ORDER — IBUPROFEN 200 MG
600 TABLET ORAL EVERY 6 HOURS
Refills: 0 | Status: DISCONTINUED | OUTPATIENT
Start: 2020-02-12 | End: 2020-02-13

## 2020-02-12 RX ORDER — ATENOLOL 25 MG/1
1 TABLET ORAL
Qty: 0 | Refills: 0 | DISCHARGE

## 2020-02-12 RX ORDER — SIMETHICONE 80 MG/1
80 TABLET, CHEWABLE ORAL EVERY 6 HOURS
Refills: 0 | Status: DISCONTINUED | OUTPATIENT
Start: 2020-02-12 | End: 2020-02-13

## 2020-02-12 RX ADMIN — OXYCODONE AND ACETAMINOPHEN 2 TABLET(S): 5; 325 TABLET ORAL at 22:30

## 2020-02-12 RX ADMIN — SODIUM CHLORIDE 3 MILLILITER(S): 9 INJECTION INTRAMUSCULAR; INTRAVENOUS; SUBCUTANEOUS at 13:14

## 2020-02-12 RX ADMIN — OXYCODONE AND ACETAMINOPHEN 2 TABLET(S): 5; 325 TABLET ORAL at 09:20

## 2020-02-12 RX ADMIN — SODIUM CHLORIDE 3 MILLILITER(S): 9 INJECTION INTRAMUSCULAR; INTRAVENOUS; SUBCUTANEOUS at 21:42

## 2020-02-12 RX ADMIN — OXYCODONE AND ACETAMINOPHEN 2 TABLET(S): 5; 325 TABLET ORAL at 21:41

## 2020-02-12 RX ADMIN — SODIUM CHLORIDE 3 MILLILITER(S): 9 INJECTION INTRAMUSCULAR; INTRAVENOUS; SUBCUTANEOUS at 06:00

## 2020-02-12 RX ADMIN — Medication 10 MILLIGRAM(S): at 16:16

## 2020-02-12 RX ADMIN — Medication 600 MILLIGRAM(S): at 12:24

## 2020-02-12 RX ADMIN — Medication 600 MILLIGRAM(S): at 18:49

## 2020-02-12 RX ADMIN — OXYCODONE AND ACETAMINOPHEN 2 TABLET(S): 5; 325 TABLET ORAL at 08:37

## 2020-02-12 RX ADMIN — Medication 30 MILLIGRAM(S): at 01:00

## 2020-02-12 RX ADMIN — Medication 600 MILLIGRAM(S): at 18:18

## 2020-02-12 RX ADMIN — Medication 30 MILLIGRAM(S): at 06:15

## 2020-02-12 RX ADMIN — SIMETHICONE 80 MILLIGRAM(S): 80 TABLET, CHEWABLE ORAL at 05:59

## 2020-02-12 RX ADMIN — Medication 30 MILLIGRAM(S): at 01:15

## 2020-02-12 RX ADMIN — Medication 100 MILLIGRAM(S): at 06:00

## 2020-02-12 RX ADMIN — Medication 600 MILLIGRAM(S): at 13:00

## 2020-02-12 RX ADMIN — Medication 5 MILLIGRAM(S): at 14:25

## 2020-02-12 RX ADMIN — Medication 30 MILLIGRAM(S): at 05:59

## 2020-02-12 NOTE — DISCHARGE NOTE PROVIDER - NSDCFUSCHEDAPPT_GEN_ALL_CORE_FT
SAINTONGEAGENORE, ROSELAURE ; 02/19/2020 ; NPP OB/ E [facility]

## 2020-02-12 NOTE — PROGRESS NOTE ADULT - ASSESSMENT
A/P:   46yo now POD#1 s/p abdominal myomectomy requiring transfusion of 2 units packed red blood cells intraoperatively  Gen: VSS  Neuro: Pain controlled on PCA. Will discontinue PCA and will transition to PO medications  CV: Will continue BP meds with hold precautions  Pulm: incentive spirometer use encouraged  GI: Bowel sounds normal, will attempt regular diet this AM  : Tran to be removed this AM with TOV to follow  Heme: AM CBC showing drop in hemoglobin, likely equilibration after surgery and blood transfusion, will repeat CBC tomorrow AM  DVT ppx: ambulation encouraged, SCDs when in bed  Dispo: will discuss with attending

## 2020-02-12 NOTE — PROGRESS NOTE ADULT - SUBJECTIVE AND OBJECTIVE BOX
ROSELAURE SAINTONGEAGENORE is a 46yo now POD#1 s/p abdominal myomectomy requiring transfusion of 2 units packed red blood cells intraoperatively    S:    Patient was seen and examined at bedside. Pain is controlled with PCA medication   She is tolerating PO fluids, but has not yet attempted a regular diet  She reports mild gas discomfort  Not yet ambulating  - flatus/-BM    O:   T(C): 36.9 (02-12-20 @ 05:48), Max: 37.2 (02-11-20 @ 11:02)  HR: 99 (02-12-20 @ 05:48) (64 - 99)  BP: 114/73 (02-12-20 @ 05:48) (103/69 - 185/91)  RR: 18 (02-12-20 @ 05:48) (12 - 18)  SpO2: 99% (02-12-20 @ 05:48) (95% - 100%)    Gen: NAD, AOx3  CV: RRR  Pulm: CTAB  Abdomen: soft, minimally distended, appropriately tender, + BS   Incision: clean dry and intact with staples in place  : + vaginal bleeding, schneider in place draining clear yellow urine  Extrem: no calf tenderness or edema, SCDs in place     Labs:                         8.9    17.03 )-----------( 169      ( 12 Feb 2020 05:51 )             27.0       02-11-20 @ 07:01  -  02-12-20 @ 06:16  --------------------------------------------------------  IN: 1500 mL / OUT: 535 mL / NET: 965 mL

## 2020-02-12 NOTE — DISCHARGE NOTE PROVIDER - HOSPITAL COURSE
Patient was admitted after her abdominal myomectomy. After an uneventful recovery, she was transferred to the floor where her stay was uncomplicated. Upon discharge she was tolerating a regular diet, ambulating without difficulty, voiding spontaneously, passing flatus and her pain was well controlled with PO pain medication.

## 2020-02-12 NOTE — DISCHARGE NOTE PROVIDER - NSDCMRMEDTOKEN_GEN_ALL_CORE_FT
Garlic oral tablet: orally once a day  ibuprofen 600 mg oral tablet: 1 tab(s) orally every 6 hours, As Needed -for moderate pain   Multiple Vitamins oral capsule: 1 cap(s) orally once a day  oxycodone-acetaminophen 5 mg-325 mg oral tablet: 1 tab(s) orally every 6 hours -Moderate Pain (4 - 6) - for severe pain MDD:4   Vitamin D3: orally once a day

## 2020-02-12 NOTE — DISCHARGE NOTE PROVIDER - CARE PROVIDER_API CALL
Garcia Goldberg)  Obstetrics and Gynecology  [address], [address]  [place] [address]  Phone: [phone]  Fax: [phone]  Follow Up Time:

## 2020-02-13 ENCOUNTER — TRANSCRIPTION ENCOUNTER (OUTPATIENT)
Age: 45
End: 2020-02-13

## 2020-02-13 VITALS
RESPIRATION RATE: 18 BRPM | OXYGEN SATURATION: 93 % | TEMPERATURE: 97 F | HEART RATE: 113 BPM | SYSTOLIC BLOOD PRESSURE: 122 MMHG | DIASTOLIC BLOOD PRESSURE: 81 MMHG

## 2020-02-13 LAB
HCT VFR BLD CALC: 20.8 % — CRITICAL LOW (ref 34.5–45)
HGB BLD-MCNC: 7.1 G/DL — LOW (ref 11.5–15.5)
MCHC RBC-ENTMCNC: 29 PG — SIGNIFICANT CHANGE UP (ref 27–34)
MCHC RBC-ENTMCNC: 34.1 GM/DL — SIGNIFICANT CHANGE UP (ref 32–36)
MCV RBC AUTO: 84.9 FL — SIGNIFICANT CHANGE UP (ref 80–100)
PLATELET # BLD AUTO: 145 K/UL — LOW (ref 150–400)
RBC # BLD: 2.45 M/UL — LOW (ref 3.8–5.2)
RBC # FLD: 16.2 % — HIGH (ref 10.3–14.5)
WBC # BLD: 16.35 K/UL — HIGH (ref 3.8–10.5)
WBC # FLD AUTO: 16.35 K/UL — HIGH (ref 3.8–10.5)

## 2020-02-13 PROCEDURE — 82962 GLUCOSE BLOOD TEST: CPT

## 2020-02-13 PROCEDURE — 85027 COMPLETE CBC AUTOMATED: CPT

## 2020-02-13 PROCEDURE — 88305 TISSUE EXAM BY PATHOLOGIST: CPT

## 2020-02-13 PROCEDURE — P9016: CPT

## 2020-02-13 PROCEDURE — 36430 TRANSFUSION BLD/BLD COMPNT: CPT

## 2020-02-13 PROCEDURE — 36415 COLL VENOUS BLD VENIPUNCTURE: CPT

## 2020-02-13 RX ORDER — DOCUSATE SODIUM 100 MG
1 CAPSULE ORAL
Qty: 30 | Refills: 0
Start: 2020-02-13

## 2020-02-13 RX ORDER — FERROUS SULFATE 325(65) MG
1 TABLET ORAL
Qty: 60 | Refills: 0
Start: 2020-02-13

## 2020-02-13 RX ORDER — ASCORBIC ACID 60 MG
500 TABLET,CHEWABLE ORAL
Refills: 0 | Status: DISCONTINUED | OUTPATIENT
Start: 2020-02-13 | End: 2020-02-13

## 2020-02-13 RX ORDER — FERROUS SULFATE 325(65) MG
325 TABLET ORAL
Refills: 0 | Status: DISCONTINUED | OUTPATIENT
Start: 2020-02-13 | End: 2020-02-13

## 2020-02-13 RX ORDER — ASCORBIC ACID 60 MG
1 TABLET,CHEWABLE ORAL
Qty: 60 | Refills: 0
Start: 2020-02-13

## 2020-02-13 RX ADMIN — Medication 600 MILLIGRAM(S): at 01:04

## 2020-02-13 RX ADMIN — OXYCODONE AND ACETAMINOPHEN 2 TABLET(S): 5; 325 TABLET ORAL at 10:27

## 2020-02-13 RX ADMIN — OXYCODONE AND ACETAMINOPHEN 2 TABLET(S): 5; 325 TABLET ORAL at 11:26

## 2020-02-13 RX ADMIN — Medication 600 MILLIGRAM(S): at 06:45

## 2020-02-13 RX ADMIN — SODIUM CHLORIDE 3 MILLILITER(S): 9 INJECTION INTRAMUSCULAR; INTRAVENOUS; SUBCUTANEOUS at 06:01

## 2020-02-13 RX ADMIN — Medication 600 MILLIGRAM(S): at 06:01

## 2020-02-13 RX ADMIN — Medication 600 MILLIGRAM(S): at 02:00

## 2020-02-13 NOTE — PROGRESS NOTE ADULT - ASSESSMENT
A/P:   44yo now POD#2 s/p abdominal myomectomy  Gen: VSS  Neuro: Pain well controlled on current regimen  CV: Continue home antihypertensives with hold parameters  Pulm: incentive spirometer use encouraged  GI: Bowel sounds and function normal, tolerating PO diet  : Tran removed, voiding spontaneously  Heme: AM labs pending  DVT ppx: ambulation encouraged, SCDs when in bed  Dispo: stable for discharge home today

## 2020-02-13 NOTE — PROGRESS NOTE ADULT - SUBJECTIVE AND OBJECTIVE BOX
ROSELAURE SAINTONGEAGENORE is a 46yo now POD#2 s/p abdominal myomectomy    S:    Patient was seen and examined at bedside. Pain is controlled with PRN medication   Tolerating regular diet, denies N/V.   Ambulating without difficulty.   + flatus/+BM/+ voiding    O:   T(C): 36.8 (02-13-20 @ 05:10), Max: 36.9 (02-12-20 @ 09:25)  HR: 108 (02-13-20 @ 05:10) (85 - 108)  BP: 106/68 (02-13-20 @ 05:10) (100/65 - 127/83)  RR: 18 (02-13-20 @ 05:10) (15 - 18)  SpO2: 93% (02-13-20 @ 05:10) (91% - 96%)    Gen: NAD, AOx3  CV: RRR  Pulm: CTAB  Abdomen: soft, nondistended, appropriately tender, + BS   Incision: clean dry and intact with staples in place  Extrem: no calf tenderness or edema     Labs:                         8.9    17.03 )-----------( 169      ( 12 Feb 2020 05:51 )             27.0   AM CBC pending    02-11-20 @ 07:01  -  02-12-20 @ 07:00  --------------------------------------------------------  IN: 1500 mL / OUT: 635 mL / NET: 865 mL    02-12-20 @ 07:01  -  02-13-20 @ 06:10  --------------------------------------------------------  IN: 0 mL / OUT: 800 mL / NET: -800 mL

## 2020-02-13 NOTE — DISCHARGE NOTE NURSING/CASE MANAGEMENT/SOCIAL WORK - PATIENT PORTAL LINK FT
You can access the FollowMyHealth Patient Portal offered by [facility] by registering at the following website: [email or web address]. By joining [employer]’s FollowMyHealth portal, you will also be able to view your health information using other applications (apps) compatible with our system.

## 2020-02-17 ENCOUNTER — INPATIENT (INPATIENT)
Facility: HOSPITAL | Age: 45
LOS: 6 days | Discharge: HOME | DRG: 299 | End: 2020-02-24
Attending: INTERNAL MEDICINE | Admitting: INTERNAL MEDICINE
Payer: COMMERCIAL

## 2020-02-17 VITALS
HEART RATE: 120 BPM | WEIGHT: 169.98 LBS | HEIGHT: 66 IN | OXYGEN SATURATION: 80 % | SYSTOLIC BLOOD PRESSURE: 163 MMHG | DIASTOLIC BLOOD PRESSURE: 102 MMHG | RESPIRATION RATE: 30 BRPM

## 2020-02-17 DIAGNOSIS — Z98.890 OTHER SPECIFIED POSTPROCEDURAL STATES: Chronic | ICD-10-CM

## 2020-02-17 DIAGNOSIS — J96.01 ACUTE RESPIRATORY FAILURE WITH HYPOXIA: ICD-10-CM

## 2020-02-17 DIAGNOSIS — I26.99 OTHER PULMONARY EMBOLISM WITHOUT ACUTE COR PULMONALE: ICD-10-CM

## 2020-02-17 LAB
ALBUMIN SERPL ELPH-MCNC: 3.5 G/DL — SIGNIFICANT CHANGE UP (ref 3.3–5)
ALP SERPL-CCNC: 92 U/L — SIGNIFICANT CHANGE UP (ref 40–120)
ALT FLD-CCNC: 6 U/L — LOW (ref 10–45)
ANION GAP SERPL CALC-SCNC: 15 MMOL/L — SIGNIFICANT CHANGE UP (ref 5–17)
APTT BLD: 27.6 SEC — SIGNIFICANT CHANGE UP (ref 27.5–36.3)
APTT BLD: 71 SEC — HIGH (ref 27.5–36.3)
AST SERPL-CCNC: 15 U/L — SIGNIFICANT CHANGE UP (ref 10–40)
BASOPHILS # BLD AUTO: 0.22 K/UL — HIGH (ref 0–0.2)
BASOPHILS NFR BLD AUTO: 1.8 % — SIGNIFICANT CHANGE UP (ref 0–2)
BILIRUB SERPL-MCNC: 0.3 MG/DL — SIGNIFICANT CHANGE UP (ref 0.2–1.2)
BLD GP AB SCN SERPL QL: NEGATIVE — SIGNIFICANT CHANGE UP
BUN SERPL-MCNC: 11 MG/DL — SIGNIFICANT CHANGE UP (ref 7–23)
CALCIUM SERPL-MCNC: 9.4 MG/DL — SIGNIFICANT CHANGE UP (ref 8.4–10.5)
CHLORIDE SERPL-SCNC: 103 MMOL/L — SIGNIFICANT CHANGE UP (ref 96–108)
CO2 SERPL-SCNC: 24 MMOL/L — SIGNIFICANT CHANGE UP (ref 22–31)
CREAT SERPL-MCNC: 1.13 MG/DL — SIGNIFICANT CHANGE UP (ref 0.5–1.3)
EOSINOPHIL # BLD AUTO: 0.11 K/UL — SIGNIFICANT CHANGE UP (ref 0–0.5)
EOSINOPHIL NFR BLD AUTO: 0.9 % — SIGNIFICANT CHANGE UP (ref 0–6)
GAS PNL BLDV: SIGNIFICANT CHANGE UP
GLUCOSE SERPL-MCNC: 153 MG/DL — HIGH (ref 70–99)
HCG SERPL-ACNC: <2 MIU/ML — SIGNIFICANT CHANGE UP
HCT VFR BLD CALC: 24.3 % — LOW (ref 34.5–45)
HCT VFR BLD CALC: 24.8 % — LOW (ref 34.5–45)
HGB BLD-MCNC: 7.8 G/DL — LOW (ref 11.5–15.5)
HGB BLD-MCNC: 8 G/DL — LOW (ref 11.5–15.5)
INR BLD: 1.05 RATIO — SIGNIFICANT CHANGE UP (ref 0.88–1.16)
LIDOCAIN IGE QN: 20 U/L — SIGNIFICANT CHANGE UP (ref 7–60)
LYMPHOCYTES # BLD AUTO: 17.4 % — SIGNIFICANT CHANGE UP (ref 13–44)
LYMPHOCYTES # BLD AUTO: 2.14 K/UL — SIGNIFICANT CHANGE UP (ref 1–3.3)
MCHC RBC-ENTMCNC: 27.8 PG — SIGNIFICANT CHANGE UP (ref 27–34)
MCHC RBC-ENTMCNC: 27.8 PG — SIGNIFICANT CHANGE UP (ref 27–34)
MCHC RBC-ENTMCNC: 32.1 GM/DL — SIGNIFICANT CHANGE UP (ref 32–36)
MCHC RBC-ENTMCNC: 32.3 GM/DL — SIGNIFICANT CHANGE UP (ref 32–36)
MCV RBC AUTO: 86.1 FL — SIGNIFICANT CHANGE UP (ref 80–100)
MCV RBC AUTO: 86.5 FL — SIGNIFICANT CHANGE UP (ref 80–100)
MONOCYTES # BLD AUTO: 0.96 K/UL — HIGH (ref 0–0.9)
MONOCYTES NFR BLD AUTO: 7.8 % — SIGNIFICANT CHANGE UP (ref 2–14)
NEUTROPHILS # BLD AUTO: 8.88 K/UL — HIGH (ref 1.8–7.4)
NEUTROPHILS NFR BLD AUTO: 70.4 % — SIGNIFICANT CHANGE UP (ref 43–77)
NRBC # BLD: 1 /100 WBCS — HIGH (ref 0–0)
PLATELET # BLD AUTO: 287 K/UL — SIGNIFICANT CHANGE UP (ref 150–400)
PLATELET # BLD AUTO: 288 K/UL — SIGNIFICANT CHANGE UP (ref 150–400)
POTASSIUM SERPL-MCNC: 4 MMOL/L — SIGNIFICANT CHANGE UP (ref 3.5–5.3)
POTASSIUM SERPL-SCNC: 4 MMOL/L — SIGNIFICANT CHANGE UP (ref 3.5–5.3)
PROT SERPL-MCNC: 7 G/DL — SIGNIFICANT CHANGE UP (ref 6–8.3)
PROTHROM AB SERPL-ACNC: 12 SEC — SIGNIFICANT CHANGE UP (ref 10–12.9)
RAPID RVP RESULT: SIGNIFICANT CHANGE UP
RBC # BLD: 2.81 M/UL — LOW (ref 3.8–5.2)
RBC # BLD: 2.88 M/UL — LOW (ref 3.8–5.2)
RBC # FLD: 16.2 % — HIGH (ref 10.3–14.5)
RBC # FLD: 16.3 % — HIGH (ref 10.3–14.5)
RH IG SCN BLD-IMP: POSITIVE — SIGNIFICANT CHANGE UP
RH IG SCN BLD-IMP: POSITIVE — SIGNIFICANT CHANGE UP
SODIUM SERPL-SCNC: 142 MMOL/L — SIGNIFICANT CHANGE UP (ref 135–145)
TROPONIN T, HIGH SENSITIVITY RESULT: 32 NG/L — SIGNIFICANT CHANGE UP (ref 0–51)
WBC # BLD: 12.31 K/UL — HIGH (ref 3.8–10.5)
WBC # BLD: 13.09 K/UL — HIGH (ref 3.8–10.5)
WBC # FLD AUTO: 12.31 K/UL — HIGH (ref 3.8–10.5)
WBC # FLD AUTO: 13.09 K/UL — HIGH (ref 3.8–10.5)

## 2020-02-17 PROCEDURE — 71045 X-RAY EXAM CHEST 1 VIEW: CPT | Mod: 26

## 2020-02-17 PROCEDURE — 99291 CRITICAL CARE FIRST HOUR: CPT

## 2020-02-17 PROCEDURE — 71275 CT ANGIOGRAPHY CHEST: CPT | Mod: 26

## 2020-02-17 PROCEDURE — 93308 TTE F-UP OR LMTD: CPT | Mod: 26

## 2020-02-17 PROCEDURE — 93010 ELECTROCARDIOGRAM REPORT: CPT

## 2020-02-17 RX ORDER — FERROUS SULFATE 325(65) MG
325 TABLET ORAL DAILY
Refills: 0 | Status: DISCONTINUED | OUTPATIENT
Start: 2020-02-17 | End: 2020-02-24

## 2020-02-17 RX ORDER — HEPARIN SODIUM 5000 [USP'U]/ML
3000 INJECTION INTRAVENOUS; SUBCUTANEOUS EVERY 6 HOURS
Refills: 0 | Status: DISCONTINUED | OUTPATIENT
Start: 2020-02-17 | End: 2020-02-18

## 2020-02-17 RX ORDER — METOCLOPRAMIDE HCL 10 MG
10 TABLET ORAL ONCE
Refills: 0 | Status: COMPLETED | OUTPATIENT
Start: 2020-02-17 | End: 2020-02-17

## 2020-02-17 RX ORDER — SODIUM CHLORIDE 9 MG/ML
1000 INJECTION INTRAMUSCULAR; INTRAVENOUS; SUBCUTANEOUS ONCE
Refills: 0 | Status: COMPLETED | OUTPATIENT
Start: 2020-02-17 | End: 2020-02-17

## 2020-02-17 RX ORDER — OXYCODONE AND ACETAMINOPHEN 5; 325 MG/1; MG/1
1 TABLET ORAL EVERY 6 HOURS
Refills: 0 | Status: DISCONTINUED | OUTPATIENT
Start: 2020-02-17 | End: 2020-02-24

## 2020-02-17 RX ORDER — HEPARIN SODIUM 5000 [USP'U]/ML
6500 INJECTION INTRAVENOUS; SUBCUTANEOUS ONCE
Refills: 0 | Status: COMPLETED | OUTPATIENT
Start: 2020-02-17 | End: 2020-02-17

## 2020-02-17 RX ORDER — VANCOMYCIN HCL 1 G
1000 VIAL (EA) INTRAVENOUS ONCE
Refills: 0 | Status: COMPLETED | OUTPATIENT
Start: 2020-02-17 | End: 2020-02-17

## 2020-02-17 RX ORDER — ATENOLOL 25 MG/1
100 TABLET ORAL DAILY
Refills: 0 | Status: DISCONTINUED | OUTPATIENT
Start: 2020-02-17 | End: 2020-02-17

## 2020-02-17 RX ORDER — CHOLECALCIFEROL (VITAMIN D3) 125 MCG
0 CAPSULE ORAL
Qty: 0 | Refills: 0 | DISCHARGE

## 2020-02-17 RX ORDER — SENNA PLUS 8.6 MG/1
2 TABLET ORAL AT BEDTIME
Refills: 0 | Status: DISCONTINUED | OUTPATIENT
Start: 2020-02-17 | End: 2020-02-24

## 2020-02-17 RX ORDER — PIPERACILLIN AND TAZOBACTAM 4; .5 G/20ML; G/20ML
3.38 INJECTION, POWDER, LYOPHILIZED, FOR SOLUTION INTRAVENOUS ONCE
Refills: 0 | Status: COMPLETED | OUTPATIENT
Start: 2020-02-17 | End: 2020-02-18

## 2020-02-17 RX ORDER — SODIUM CHLORIDE 9 MG/ML
1000 INJECTION INTRAMUSCULAR; INTRAVENOUS; SUBCUTANEOUS
Refills: 0 | Status: DISCONTINUED | OUTPATIENT
Start: 2020-02-17 | End: 2020-02-24

## 2020-02-17 RX ORDER — PIPERACILLIN AND TAZOBACTAM 4; .5 G/20ML; G/20ML
3.38 INJECTION, POWDER, LYOPHILIZED, FOR SOLUTION INTRAVENOUS ONCE
Refills: 0 | Status: COMPLETED | OUTPATIENT
Start: 2020-02-17 | End: 2020-02-17

## 2020-02-17 RX ORDER — ATENOLOL 25 MG/1
100 TABLET ORAL DAILY
Refills: 0 | Status: DISCONTINUED | OUTPATIENT
Start: 2020-02-17 | End: 2020-02-24

## 2020-02-17 RX ORDER — SODIUM CHLORIDE 9 MG/ML
1000 INJECTION INTRAMUSCULAR; INTRAVENOUS; SUBCUTANEOUS ONCE
Refills: 0 | Status: DISCONTINUED | OUTPATIENT
Start: 2020-02-17 | End: 2020-02-17

## 2020-02-17 RX ORDER — GARLIC 1000 MG
0 CAPSULE ORAL
Qty: 0 | Refills: 0 | DISCHARGE

## 2020-02-17 RX ORDER — HEPARIN SODIUM 5000 [USP'U]/ML
6500 INJECTION INTRAVENOUS; SUBCUTANEOUS EVERY 6 HOURS
Refills: 0 | Status: DISCONTINUED | OUTPATIENT
Start: 2020-02-17 | End: 2020-02-18

## 2020-02-17 RX ORDER — HEPARIN SODIUM 5000 [USP'U]/ML
INJECTION INTRAVENOUS; SUBCUTANEOUS
Qty: 25000 | Refills: 0 | Status: DISCONTINUED | OUTPATIENT
Start: 2020-02-17 | End: 2020-02-18

## 2020-02-17 RX ORDER — ATENOLOL 25 MG/1
1 TABLET ORAL
Qty: 0 | Refills: 0 | DISCHARGE

## 2020-02-17 RX ADMIN — Medication 250 MILLIGRAM(S): at 19:58

## 2020-02-17 RX ADMIN — HEPARIN SODIUM 1400 UNIT(S)/HR: 5000 INJECTION INTRAVENOUS; SUBCUTANEOUS at 22:35

## 2020-02-17 RX ADMIN — SODIUM CHLORIDE 1000 MILLILITER(S): 9 INJECTION INTRAMUSCULAR; INTRAVENOUS; SUBCUTANEOUS at 12:21

## 2020-02-17 RX ADMIN — HEPARIN SODIUM 1400 UNIT(S)/HR: 5000 INJECTION INTRAVENOUS; SUBCUTANEOUS at 16:04

## 2020-02-17 RX ADMIN — HEPARIN SODIUM 5000 UNIT(S): 5000 INJECTION INTRAVENOUS; SUBCUTANEOUS at 16:06

## 2020-02-17 RX ADMIN — PIPERACILLIN AND TAZOBACTAM 200 GRAM(S): 4; .5 INJECTION, POWDER, LYOPHILIZED, FOR SOLUTION INTRAVENOUS at 16:54

## 2020-02-17 RX ADMIN — SODIUM CHLORIDE 75 MILLILITER(S): 9 INJECTION INTRAMUSCULAR; INTRAVENOUS; SUBCUTANEOUS at 21:03

## 2020-02-17 RX ADMIN — SODIUM CHLORIDE 1000 MILLILITER(S): 9 INJECTION INTRAMUSCULAR; INTRAVENOUS; SUBCUTANEOUS at 15:49

## 2020-02-17 RX ADMIN — Medication 10 MILLIGRAM(S): at 16:19

## 2020-02-17 NOTE — ED ADULT NURSE NOTE - OBJECTIVE STATEMENT
46 yo F aaox4 BIB EMS SOB, PMH Fibroid removal last week. C/o of vaginal bleed. Presents tachycardic, and hypoxic. Brought to [facility]. IV line placed by EMS, and one IV placed by RN staff. Labs drawn and sent. Provider at bedside. BIPAP ordered for pt. Pt maintained on CM.

## 2020-02-17 NOTE — ED PROVIDER NOTE - PROGRESS NOTE DETAILS
Sat and RR improved on Bipap. Sat 100%, will attempt to titrate down sat. Hb 8, HR improving. Awaiting CT for eval for PE. RR slowly improving, Sat > 90% on Bipap Spoke with OB surg, Dr Goldberg. Aware of results thus far, labs, improved on BiPAP. Will callback once CT results Xr without focal consolidation. Awaiting CT for dispo Spoke with Radiology. Left Main PE extending into segmentals. Also with bilateral lower lobe consolidations. Will start heparin given recent post-op (titratable) and abx. TBA Spoke with primary OB. No admitting privileges here. Aware of CT results. To admit per PMD to medicine

## 2020-02-17 NOTE — H&P ADULT - HISTORY OF PRESENT ILLNESS
46yo Female w. pmhx of HTN and fibroids s/p abdominal myomectomy requiring transfusion of packed red blood cells intraoperatively, discharged 2/13/2020 presents to ER for episode of syncope today.    Sudden onset of SOB and lightheadedness, followed by syncope.    EMS arrived on scene patient was tachycardia / tachypneic and sating at 50% ,   .  Patient admits to continued vaginal bleeding since surgery, no bleeding or pain at surgical site.    Denies fever, chills, n/v, visual changes, chest pain, hx of PE/dvt, leg swelling.

## 2020-02-17 NOTE — ED PROVIDER NOTE - CARE PLAN
Principal Discharge DX:	Acute respiratory failure with hypoxia  Secondary Diagnosis:	Syncope, unspecified syncope type Principal Discharge DX:	Acute respiratory failure with hypoxia  Secondary Diagnosis:	Syncope, unspecified syncope type  Secondary Diagnosis:	Pulmonary embolism

## 2020-02-17 NOTE — H&P ADULT - NSCORESITESY/N_GEN_A_CORE_RD

## 2020-02-17 NOTE — ED ADULT NURSE REASSESSMENT NOTE - NS ED NURSE REASSESS COMMENT FT1
Pt aaox4. Lying comfortable in stretcher. No acute distress at this time. Updated on plan of care. Awaiting hospital bed assignment maintained on CM

## 2020-02-17 NOTE — ED PROVIDER NOTE - ATTENDING CONTRIBUTION TO CARE
Jocelyn Kitchen MD - Attending Physician: I have personally seen and examined this patient. I have discussed the case with the ACP. I have reviewed all pertinent clinical information, including history, physical exam, plan and the ACP’s note and agree except as noted. See MDM

## 2020-02-17 NOTE — H&P ADULT - NSHPLABSRESULTS_GEN_ALL_CORE
8.0    12.31 )-----------( 287      ( 17 Feb 2020 12:01 )             24.8       02-17    142  |  103  |  11  ----------------------------<  153<H>  4.0   |  24  |  1.13    Ca    9.4      17 Feb 2020 12:01    TPro  7.0  /  Alb  3.5  /  TBili  0.3  /  DBili  x   /  AST  15  /  ALT  6<L>  /  AlkPhos  92  02-17                  PT/INR - ( 17 Feb 2020 12:01 )   PT: 12.0 sec;   INR: 1.05 ratio         PTT - ( 17 Feb 2020 12:01 )  PTT:27.6 sec    Lactate Trend            CAPILLARY BLOOD GLUCOSE

## 2020-02-17 NOTE — ED PROVIDER NOTE - ENMT, MLM
Airway patent, Nasal mucosa clear. Mouth with normal mucosa. Throat has no vesicles, no oropharyngeal exudates and uvula is midline. Airway patent, Nasal mucosa clear. Mouth with normal mucosa

## 2020-02-17 NOTE — ED PROVIDER NOTE - CRITICAL CARE PROVIDED
direct patient care (not related to procedure)/additional history taking/documentation/consult w/ pt's family directly relating to pts condition/consultation with other physicians/interpretation of diagnostic studies

## 2020-02-17 NOTE — ED PROVIDER NOTE - RESPIRATORY DISTRESS
+ accessory muscle use on nasal cannula/YES + tachypnea and accessory muscle use on nasal cannula/YES

## 2020-02-17 NOTE — ED PROVIDER NOTE - GASTROINTESTINAL, MLM
Abdomen soft, non-tender, no guarding. Abdomen soft, non-tender, healing incision to abdomen from surgery.

## 2020-02-17 NOTE — ED PROCEDURE NOTE - PROCEDURE ADDITIONAL DETAILS
POCUS: Emergency Department Focused Ultrasound performed at patient's bedside.  The complete report may be available in PACS, see below for findings

## 2020-02-17 NOTE — ED PROVIDER NOTE - OBJECTIVE STATEMENT
46yo F pmhx of HTN and fibroids s/p abdominal myomectomy requiring transfusion of 2 units packed red blood cells intraoperatively, discharged 2/13/2020 presents to ER for episode of syncope today.  Sudden onset of SOB and lightheadedness, followed by syncope.  EMS arrived on scene patient was tachycardia 120, tachypneic 30 and sating at 50% o2, after nasal canula place patient sating at 80%.  Patient admits to continued vaginal bleeding since surgery, no bleeding or pain at surgical site.  Denies fever, chills, n/v visual changes, chest pain 46yo F pmhx of HTN and fibroids s/p abdominal myomectomy requiring transfusion of 2 units packed red blood cells intraoperatively, discharged 2/13/2020 presents to ER for episode of syncope today.  Sudden onset of SOB and lightheadedness, followed by syncope.  EMS arrived on scene patient was tachycardia 120, tachypneic 30 and sating at 50% o2, after nasal canula place patient sating at 80%.  Patient admits to continued vaginal bleeding since surgery, no bleeding or pain at surgical site.  Denies fever, chills, n/v visual changes, chest pain, hx of PE/dvt, leg swelling. 44yo F pmhx of HTN and fibroids s/p abdominal myomectomy requiring transfusion of 2 units packed red blood cells intraoperatively, discharged 2/13/2020 presents to ER for episode of syncope today.  Sudden onset of SOB and lightheadedness, followed by syncope.  EMS arrived on scene patient was tachycardia 120, tachypneic 30 and sating at 50% o2, after 100% NRB placed patient sating at 80%.  Patient admits to continued vaginal bleeding since surgery, no bleeding or pain at surgical site.  Denies fever, chills, n/v, visual changes, chest pain, hx of PE/dvt, leg swelling.

## 2020-02-17 NOTE — ED PROVIDER NOTE - CONSTITUTIONAL APPEARANCE HYGIENE, MLM
mild distressed- tachypnea, accessory muscle use significant distressed- tachypnea, accessory muscle use/ILL APPEARING

## 2020-02-17 NOTE — ED PROVIDER NOTE - CLINICAL SUMMARY MEDICAL DECISION MAKING FREE TEXT BOX
Jocelyn Kitchen MD - Attending Physician: Pt here with syncope, hypoxia, tachypnea. Recent Surg. Concern for large PE vs severe Anemia. Remains hypoxic on NRB on arrival. BiPAP, Labs, Cultures, Xr, CT for PE.

## 2020-02-18 DIAGNOSIS — I26.99 OTHER PULMONARY EMBOLISM WITHOUT ACUTE COR PULMONALE: ICD-10-CM

## 2020-02-18 DIAGNOSIS — Z98.890 OTHER SPECIFIED POSTPROCEDURAL STATES: ICD-10-CM

## 2020-02-18 DIAGNOSIS — R55 SYNCOPE AND COLLAPSE: ICD-10-CM

## 2020-02-18 DIAGNOSIS — J18.9 PNEUMONIA, UNSPECIFIED ORGANISM: ICD-10-CM

## 2020-02-18 LAB
APTT BLD: 33.6 SEC — SIGNIFICANT CHANGE UP (ref 27.5–36.3)
APTT BLD: 52.3 SEC — HIGH (ref 27.5–36.3)
BASE EXCESS BLDV CALC-SCNC: 2.9 MMOL/L — HIGH (ref -2–2)
CA-I SERPL-SCNC: 1.17 MMOL/L — SIGNIFICANT CHANGE UP (ref 1.12–1.3)
CHLORIDE BLDV-SCNC: 107 MMOL/L — SIGNIFICANT CHANGE UP (ref 96–108)
CK MB CFR SERPL CALC: 1 NG/ML — SIGNIFICANT CHANGE UP (ref 0–3.8)
CK SERPL-CCNC: 54 U/L — SIGNIFICANT CHANGE UP (ref 25–170)
CO2 BLDV-SCNC: 29 MMOL/L — SIGNIFICANT CHANGE UP (ref 22–30)
GAS PNL BLDV: 138 MMOL/L — SIGNIFICANT CHANGE UP (ref 135–145)
GAS PNL BLDV: SIGNIFICANT CHANGE UP
GLUCOSE BLDV-MCNC: 124 MG/DL — HIGH (ref 70–99)
HCO3 BLDV-SCNC: 27 MMOL/L — SIGNIFICANT CHANGE UP (ref 21–29)
HCT VFR BLD CALC: 23.2 % — LOW (ref 34.5–45)
HCT VFR BLD CALC: 29 % — LOW (ref 34.5–45)
HCT VFR BLDA CALC: 28 % — LOW (ref 39–50)
HGB BLD CALC-MCNC: 8.9 G/DL — LOW (ref 11.5–15.5)
HGB BLD-MCNC: 7.1 G/DL — LOW (ref 11.5–15.5)
HGB BLD-MCNC: 9 G/DL — LOW (ref 11.5–15.5)
LACTATE BLDV-MCNC: 2.6 MMOL/L — HIGH (ref 0.7–2)
MCHC RBC-ENTMCNC: 26.9 PG — LOW (ref 27–34)
MCHC RBC-ENTMCNC: 27.5 PG — SIGNIFICANT CHANGE UP (ref 27–34)
MCHC RBC-ENTMCNC: 30.6 GM/DL — LOW (ref 32–36)
MCHC RBC-ENTMCNC: 31 GM/DL — LOW (ref 32–36)
MCV RBC AUTO: 87.9 FL — SIGNIFICANT CHANGE UP (ref 80–100)
MCV RBC AUTO: 88.7 FL — SIGNIFICANT CHANGE UP (ref 80–100)
NRBC # BLD: 0 /100 WBCS — SIGNIFICANT CHANGE UP (ref 0–0)
NRBC # BLD: 0 /100 WBCS — SIGNIFICANT CHANGE UP (ref 0–0)
NT-PROBNP SERPL-SCNC: 178 PG/ML — SIGNIFICANT CHANGE UP (ref 0–300)
PCO2 BLDV: 44 MMHG — SIGNIFICANT CHANGE UP (ref 35–50)
PH BLDV: 7.41 — SIGNIFICANT CHANGE UP (ref 7.35–7.45)
PLATELET # BLD AUTO: 293 K/UL — SIGNIFICANT CHANGE UP (ref 150–400)
PLATELET # BLD AUTO: 295 K/UL — SIGNIFICANT CHANGE UP (ref 150–400)
PO2 BLDV: 27 MMHG — SIGNIFICANT CHANGE UP (ref 25–45)
POTASSIUM BLDV-SCNC: 3.6 MMOL/L — SIGNIFICANT CHANGE UP (ref 3.5–5.3)
RBC # BLD: 2.64 M/UL — LOW (ref 3.8–5.2)
RBC # BLD: 3.27 M/UL — LOW (ref 3.8–5.2)
RBC # FLD: 16.7 % — HIGH (ref 10.3–14.5)
RBC # FLD: 16.9 % — HIGH (ref 10.3–14.5)
SAO2 % BLDV: 39 % — LOW (ref 67–88)
TROPONIN T, HIGH SENSITIVITY RESULT: 20 NG/L — SIGNIFICANT CHANGE UP (ref 0–51)
WBC # BLD: 11.22 K/UL — HIGH (ref 3.8–10.5)
WBC # BLD: 12.12 K/UL — HIGH (ref 3.8–10.5)
WBC # FLD AUTO: 11.22 K/UL — HIGH (ref 3.8–10.5)
WBC # FLD AUTO: 12.12 K/UL — HIGH (ref 3.8–10.5)

## 2020-02-18 PROCEDURE — 99222 1ST HOSP IP/OBS MODERATE 55: CPT

## 2020-02-18 RX ORDER — APIXABAN 2.5 MG/1
10 TABLET, FILM COATED ORAL EVERY 12 HOURS
Refills: 0 | Status: DISCONTINUED | OUTPATIENT
Start: 2020-02-18 | End: 2020-02-24

## 2020-02-18 RX ORDER — ACETAMINOPHEN 500 MG
650 TABLET ORAL EVERY 6 HOURS
Refills: 0 | Status: DISCONTINUED | OUTPATIENT
Start: 2020-02-18 | End: 2020-02-24

## 2020-02-18 RX ADMIN — Medication 325 MILLIGRAM(S): at 15:18

## 2020-02-18 RX ADMIN — OXYCODONE AND ACETAMINOPHEN 1 TABLET(S): 5; 325 TABLET ORAL at 01:44

## 2020-02-18 RX ADMIN — HEPARIN SODIUM 3000 UNIT(S): 5000 INJECTION INTRAVENOUS; SUBCUTANEOUS at 05:12

## 2020-02-18 RX ADMIN — HEPARIN SODIUM 6500 UNIT(S): 5000 INJECTION INTRAVENOUS; SUBCUTANEOUS at 13:12

## 2020-02-18 RX ADMIN — OXYCODONE AND ACETAMINOPHEN 1 TABLET(S): 5; 325 TABLET ORAL at 00:50

## 2020-02-18 RX ADMIN — APIXABAN 10 MILLIGRAM(S): 2.5 TABLET, FILM COATED ORAL at 19:02

## 2020-02-18 RX ADMIN — ATENOLOL 100 MILLIGRAM(S): 25 TABLET ORAL at 05:10

## 2020-02-18 RX ADMIN — HEPARIN SODIUM 1900 UNIT(S)/HR: 5000 INJECTION INTRAVENOUS; SUBCUTANEOUS at 13:09

## 2020-02-18 RX ADMIN — PIPERACILLIN AND TAZOBACTAM 25 GRAM(S): 4; .5 INJECTION, POWDER, LYOPHILIZED, FOR SOLUTION INTRAVENOUS at 00:50

## 2020-02-18 RX ADMIN — HEPARIN SODIUM 1600 UNIT(S)/HR: 5000 INJECTION INTRAVENOUS; SUBCUTANEOUS at 05:10

## 2020-02-18 NOTE — CONSULT NOTE ADULT - PROBLEM SELECTOR RECOMMENDATION 2
Bibasilar dependent infiltrates  ?aspiration event inta-op  -Appreciate ID consult, observe off abx for now  -Low threshold to start Bibasilar dependent atelectasis  ?aspiration event inta-op  -Appreciate ID consult, observe off abx for now  -Low threshold to start if decompensates

## 2020-02-18 NOTE — PROGRESS NOTE ADULT - ASSESSMENT
pt w/ pulm embolus after sx  iv heparin  pulm eval  gyn eval  bipap prn  trial on n/c o2  echo  c/w atenolol   cards eval

## 2020-02-18 NOTE — CONSULT NOTE ADULT - SUBJECTIVE AND OBJECTIVE BOX
Neurology consult    ROSELAURE SAINTONGEAGENSAINTONGEAGENORD45yFemale    HPI:  45F with PMH of HTN and uterine fibroids s/p  myomectomy (2/11/20) who was brought into the ED for syncopal episode. Patient was recently discharged after uterine myomectomy on 2/13, during which intraop course was c/b blood loss anemia warranting 2u PRBC transfusion. ,Since discharege    Sudden onset of SOB and lightheadedness, followed by syncope.    EMS arrived on scene patient was tachycardia / tachypneic and sating at 50% ,   .  Patient admits to continued vaginal bleeding since surgery, no bleeding or pain at surgical site.    Denies fever, chills, n/v, visual changes, chest pain, hx of PE/dvt, leg swelling. (17 Feb 2020 19:10)          MEDICATIONS    ATENolol  Tablet 100 milliGRAM(s) Oral daily  ferrous    sulfate 325 milliGRAM(s) Oral daily  heparin  Infusion.  Unit(s)/Hr IV Continuous <Continuous>  heparin  Injectable 6500 Unit(s) IV Push every 6 hours PRN  heparin  Injectable 3000 Unit(s) IV Push every 6 hours PRN  oxycodone    5 mG/acetaminophen 325 mG 1 Tablet(s) Oral every 6 hours PRN  senna 2 Tablet(s) Oral at bedtime  sodium chloride 0.9%. 1000 milliLiter(s) IV Continuous <Continuous>         Family history: No history of dementia, strokes, or seizures   FAMILY HISTORY:  FH: diabetes mellitus  Family history of hypertension: father and mother    SOCIAL HISTORY -- No history of tobacco or alcohol use     Allergies    No Known Allergies    Intolerances    plastic tray only (Unknown)      Height (cm): 167.64 (02-17 @ 11:23)  Weight (kg): 78.4 (02-17 @ 15:48)  BMI (kg/m2): 27.9 (02-17 @ 15:48)    Vital Signs Last 24 Hrs  T(C): 36.9 (18 Feb 2020 04:46), Max: 38 (17 Feb 2020 12:03)  T(F): 98.4 (18 Feb 2020 04:46), Max: 100.4 (17 Feb 2020 12:03)  HR: 73 (18 Feb 2020 05:38) (73 - 120)  BP: 152/85 (18 Feb 2020 04:46) (138/91 - 163/102)  BP(mean): --  RR: 22 (18 Feb 2020 04:46) (22 - 34)  SpO2: 100% (18 Feb 2020 05:38) (80% - 100%)      REVIEW OF SYSTEMS:    Constitutional: No fever, chills, fatigue, weakness  Eyes: no eye pain, visual disturbances, or discharge  ENT:  No difficulty hearing, tinnitus, vertigo; No sinus or throat pain  Neck: No pain or stiffness  Respiratory: No cough, dyspnea, wheezing   Cardiovascular: No chest pain, palpitations,   Gastrointestinal: No abdominal or epigastric pain. No nausea, vomiting  No diarrhea or constipation.   Genitourinary: No dysuria, frequency, hematuria or incontinence  Neurological: No headaches, lightheadedness, vertigo, numbness or tremors  Psychiatric: No depression, anxiety, mood swings or difficulty sleeping  Musculoskeletal: No joint pain or swelling; No muscle, back or extremity pain  Skin: No itching, burning, rashes or lesions   Lymph Nodes: No enlarged glands  Endocrine: No heat or cold intolerance; No hair loss, No h/o diabetes or thyroid dysfunction  Allergy and Immunologic: No hives or eczema    On Neurological Examination:    Mental Status - Patient is alert, awake, oriented X3. Confused Dementia Lethargic .     Follows commands well and able to answer questions appropriately. Mood and affect  normal  Follow simple commands  Follow complex commands  Does not follow commands    Speech -   Fluent    Dysarthria  Aphasia                              Cranial Nerves - Pupils 3 mm equal and reactive to light,   extraocular eye movements intact.     Motor Exam -   Right upper  Left upper  Right lower  Left lower     With stimuli positive movement of all 4 extremities    Muscle tone - is normal all over. No asymmetry is seen.      Sensory    Bilateral intact to light touch    Gait -  normal  ataxia     GENERAL Exam:     Nontoxic , No Acute Distress   	  HEENT:  normocephalic, atraumatic  		  LUNGS:	Clear bilaterally  No Wheeze  Decreased bilaterally  	  HEART:	 Normal S1S2   No murmur RRR        	  GI/ ABDOMEN:  Soft  Non tender    EXTREMITIES:   No Edema  No Clubbing  No Cyanosis No Edema    MUSCULOSKELETAL: Normal Range of Motion  	   SKIN:      Normal   No Ecchymosis               LABS:  CBC Full  -  ( 18 Feb 2020 06:45 )  WBC Count : 12.12 K/uL  RBC Count : 2.64 M/uL  Hemoglobin : 7.1 g/dL  Hematocrit : 23.2 %  Platelet Count - Automated : 295 K/uL  Mean Cell Volume : 87.9 fl  Mean Cell Hemoglobin : 26.9 pg  Mean Cell Hemoglobin Concentration : 30.6 gm/dL  Auto Neutrophil # : x  Auto Lymphocyte # : x  Auto Monocyte # : x  Auto Eosinophil # : x  Auto Basophil # : x  Auto Neutrophil % : x  Auto Lymphocyte % : x  Auto Monocyte % : x  Auto Eosinophil % : x  Auto Basophil % : x      02-17    142  |  103  |  11  ----------------------------<  153<H>  4.0   |  24  |  1.13    Ca    9.4      17 Feb 2020 12:01    TPro  7.0  /  Alb  3.5  /  TBili  0.3  /  DBili  x   /  AST  15  /  ALT  6<L>  /  AlkPhos  92  02-17    Hemoglobin A1C:     LIVER FUNCTIONS - ( 17 Feb 2020 12:01 )  Alb: 3.5 g/dL / Pro: 7.0 g/dL / ALK PHOS: 92 U/L / ALT: 6 U/L / AST: 15 U/L / GGT: x           Vitamin B12   PT/INR - ( 17 Feb 2020 12:01 )   PT: 12.0 sec;   INR: 1.05 ratio         PTT - ( 18 Feb 2020 04:31 )  PTT:52.3 sec      RADIOLOGY    EKG Neurology consult    ROSELAURE SAINTONGEAGENSAINTONGEAGENORD45yFemale    HPI:  45F with PMH of HTN and uterine fibroids s/p  myomectomy (2/11/20) who was brought into the ED for syncopal episode. Patient was recently discharged after uterine myomectomy on 2/13, during which intraop course was c/b blood loss anemia warranting 2u PRBC transfusion. Since discharge, patient had been endorsing dyspnea and labored breathing. On day of admission, patient had gone to bathroom felt significant lightheadedness, headache, and difficulty breathing. She shortly after became lightheaded and passed out in the bathroom, which was the last thing she remembers. Her  promptly called EMS. On their arrival, she was found to be tachycardic tachypneic, and hypoxic despite NRB saturating 80%. Of note also, patient was having ongoing vaginal bleeding. Both her  and she denies fevers, chills, chest pain, limb weakness, or difficulty ambulating.    In ER, patient was found to have PE as well as bilateral lower lobe consolidation. She was transitioned to Bipap mask and started on heparin gtt, denying any additional symptoms including vaginal bleeding.         MEDICATIONS    ATENolol  Tablet 100 milliGRAM(s) Oral daily  ferrous    sulfate 325 milliGRAM(s) Oral daily  heparin  Infusion.  Unit(s)/Hr IV Continuous <Continuous>  heparin  Injectable 6500 Unit(s) IV Push every 6 hours PRN  heparin  Injectable 3000 Unit(s) IV Push every 6 hours PRN  oxycodone    5 mG/acetaminophen 325 mG 1 Tablet(s) Oral every 6 hours PRN  senna 2 Tablet(s) Oral at bedtime  sodium chloride 0.9%. 1000 milliLiter(s) IV Continuous <Continuous>         Family history: No history of dementia, strokes, or seizures   FAMILY HISTORY:  FH: diabetes mellitus  Family history of hypertension: father and mother    SOCIAL HISTORY -- No history of tobacco or alcohol use     Allergies    No Known Allergies    Intolerances    plastic tray only (Unknown)      Height (cm): 167.64 (02-17 @ 11:23)  Weight (kg): 78.4 (02-17 @ 15:48)  BMI (kg/m2): 27.9 (02-17 @ 15:48)    Vital Signs Last 24 Hrs  T(C): 36.9 (18 Feb 2020 04:46), Max: 38 (17 Feb 2020 12:03)  T(F): 98.4 (18 Feb 2020 04:46), Max: 100.4 (17 Feb 2020 12:03)  HR: 73 (18 Feb 2020 05:38) (73 - 120)  BP: 152/85 (18 Feb 2020 04:46) (138/91 - 163/102)  BP(mean): --  RR: 22 (18 Feb 2020 04:46) (22 - 34)  SpO2: 100% (18 Feb 2020 05:38) (80% - 100%)      REVIEW OF SYSTEMS:    Constitutional: No fever, chills, fatigue, weakness  Eyes: no eye pain, visual disturbances, or discharge  ENT:  No difficulty hearing, tinnitus, vertigo; No sinus or throat pain  Neck: No pain or stiffness  Respiratory: No cough, + dyspnea, no wheezing   Cardiovascular: No chest pain, palpitations  Gastrointestinal: No abdominal or epigastric pain. No nausea, vomiting  No diarrhea or constipation.   Genitourinary: No dysuria, frequency, hematuria or incontinence  Neurological: No headaches, lightheadedness, vertigo, numbness or tremors  Psychiatric: No depression, anxiety, mood swings or difficulty sleeping  Musculoskeletal: No joint pain or swelling; No muscle, back or extremity pain  Skin: No itching, burning, rashes or lesions   Lymph Nodes: No enlarged glands  Endocrine: No heat or cold intolerance; No hair loss, No h/o diabetes or thyroid dysfunction  Allergy and Immunologic: No hives or eczema    On Neurological Examination:    Mental Status - Patient is alert, awake, oriented X3. Confused Dementia Lethargic .     Follows commands well and able to answer questions appropriately. Mood and affect  normal  Follow simple commands yes    Speech -   Fluent    Dysarthria  Aphasia                              Cranial Nerves - Pupils 3 mm equal and reactive to light,   extraocular eye movements intact.     Motor Exam -   Right upper 5/5  Left upper 5/5  Right lower 5/5  Left lower 5/5    With stimuli positive movement of all 4 extremities    Muscle tone - is normal all over. No asymmetry is seen.      Sensory    Bilateral intact to light touch    Gait -  unable to asses    GENERAL Exam:     Nontoxic , No Acute Distress   	  HEENT:  normocephalic, atraumatic  		  LUNGS:	Clear bilaterally  No Wheeze  Decreased bilaterally  	  HEART:	 Normal S1S2   No murmur RRR        	  GI/ ABDOMEN:  Soft  Non tender    EXTREMITIES:   No Edema  No Clubbing  No Cyanosis No Edema    MUSCULOSKELETAL: Normal Range of Motion  	   SKIN:      Normal   No Ecchymosis               LABS:  CBC Full  -  ( 18 Feb 2020 06:45 )  WBC Count : 12.12 K/uL  RBC Count : 2.64 M/uL  Hemoglobin : 7.1 g/dL  Hematocrit : 23.2 %  Platelet Count - Automated : 295 K/uL  Mean Cell Volume : 87.9 fl  Mean Cell Hemoglobin : 26.9 pg  Mean Cell Hemoglobin Concentration : 30.6 gm/dL  Auto Neutrophil # : x  Auto Lymphocyte # : x  Auto Monocyte # : x  Auto Eosinophil # : x  Auto Basophil # : x  Auto Neutrophil % : x  Auto Lymphocyte % : x  Auto Monocyte % : x  Auto Eosinophil % : x  Auto Basophil % : x      02-17    142  |  103  |  11  ----------------------------<  153<H>  4.0   |  24  |  1.13    Ca    9.4      17 Feb 2020 12:01    TPro  7.0  /  Alb  3.5  /  TBili  0.3  /  DBili  x   /  AST  15  /  ALT  6<L>  /  AlkPhos  92  02-17    Hemoglobin A1C:     LIVER FUNCTIONS - ( 17 Feb 2020 12:01 )  Alb: 3.5 g/dL / Pro: 7.0 g/dL / ALK PHOS: 92 U/L / ALT: 6 U/L / AST: 15 U/L / GGT: x           Vitamin B12   PT/INR - ( 17 Feb 2020 12:01 )   PT: 12.0 sec;   INR: 1.05 ratio         PTT - ( 18 Feb 2020 04:31 )  PTT:52.3 sec      RADIOLOGY    EKG  Sinus tachycardia 120

## 2020-02-18 NOTE — CONSULT NOTE ADULT - ASSESSMENT
44 y/o F with PMH of HTN, recent myomectomy (2/11/20 at [place]) requiring PRBC transfusion introp, discharged from [facility] on 2/13 and presents to ER 2/17 with syncopal episode. Found to have PE. Patient states she was having shortness of breath shortly after discharge and cough with thick white phlegm. CT chest with bibasilar PNA/pneumonitis

## 2020-02-18 NOTE — PROVIDER CONTACT NOTE (OTHER) - ASSESSMENT
Pt A&Ox4, denies chills, headaches, pain, sitting up in the bed comfortably with no s/s of distress.

## 2020-02-18 NOTE — CONSULT NOTE ADULT - ASSESSMENT
45 year old admitted after recent discharge for myomectomy with anemia  admitted for syncope and sob and found to have a PE. chest xray is clear but there are consolidations in lower lobes   chills but no real fever.  no sputum or chest pain     IO doubt she had pna  no signs s on exam .    agree with heparin for PE and will follow

## 2020-02-18 NOTE — CONSULT NOTE ADULT - SUBJECTIVE AND OBJECTIVE BOX
CARDIOLOGY CONSULT - Dr. Lakhani         HPI:  44yo Female w. pmhx of HTN and fibroids s/p abdominal myomectomy requiring transfusion of packed red blood cells intraoperatively, discharged 2/13/2020 presents to ER for episode of syncope today. Patient does not recall events leading to her passing out. Her  repots she has a sudden onset of SOB and lightheadedness, was attempting to sit down followed by syncope.  On EMS arrival  patient was tachycardia / tachypneic and sating at 50%. Patient admits to continued vaginal bleeding since surgery, no bleeding or pain at surgical site. Decrease motility since surgery. She denies hx of MI , CAD, CHF, PE/dvt , or arrhytmias. No recent cardiac work up. Denies cp, palps, orthopnea, sob on exam/ ROS otherwise negative.       PAST MEDICAL & SURGICAL HISTORY:  Hypertension  History of myomectomy          PREVIOUS DIAGNOSTIC TESTING:    [ ] Echocardiogram:  [ ]  Catheterization:  [ ] Stress Test:  	    MEDICATIONS:  Home Medications:  atenolol 100 mg oral tablet: 1 tab(s) orally once a day (17 Feb 2020 18:33)      MEDICATIONS  (STANDING):  ATENolol  Tablet 100 milliGRAM(s) Oral daily  ferrous    sulfate 325 milliGRAM(s) Oral daily  heparin  Infusion.  Unit(s)/Hr (14 mL/Hr) IV Continuous <Continuous>  senna 2 Tablet(s) Oral at bedtime  sodium chloride 0.9%. 1000 milliLiter(s) (75 mL/Hr) IV Continuous <Continuous>      FAMILY HISTORY:  FH: diabetes mellitus  Family history of hypertension: father and mother      SOCIAL HISTORY:    [ x] Non-smoker  [ ] Smoker  [ ] Alcohol : denies     Allergies    No Known Allergies    Intolerances    plastic tray only (Unknown)  	    REVIEW OF SYSTEMS:  CONSTITUTIONAL: No fever, weight loss, or fatigue  EYES: No eye pain, visual disturbances, or discharge  ENMT:  No difficulty hearing, tinnitus, vertigo; No sinus or throat pain  NECK: No pain or stiffness  RESPIRATORY: No cough, wheezing, chills or hemoptysis; No Shortness of Breath  CARDIOVASCULAR: No chest pain, palpitations, passing out, dizziness, or leg swelling  GASTROINTESTINAL: No abdominal or epigastric pain. No nausea, vomiting, or hematemesis; No diarrhea or constipation. No melena or hematochezia.  GENITOURINARY: No dysuria, frequency, hematuria, or incontinence  NEUROLOGICAL: No headaches, memory loss, loss of strength, numbness, or tremors  SKIN: No itching, burning, rashes, or lesions   	    [x ] All others negative	  [ ] Unable to obtain    PHYSICAL EXAM:  T(C): 37.2 (02-18-20 @ 08:56), Max: 38 (02-17-20 @ 12:03)  HR: 71 (02-18-20 @ 09:49) (71 - 120)  BP: 153/97 (02-18-20 @ 08:56) (138/91 - 163/102)  RR: 19 (02-18-20 @ 08:56) (19 - 34)  SpO2: 100% (02-18-20 @ 09:49) (80% - 100%)  Wt(kg): --  I&O's Summary      Appearance: Normal	  Psychiatry: A & O x 3, Mood & affect appropriate  HEENT:   Normal oral mucosa, PERRL, EOMI	  Lymphatic: No lymphadenopathy  Cardiovascular: Normal S1 S2,RRR, No JVD, No murmurs  Respiratory: Lungs clear to auscultation	  Gastrointestinal:  Soft, Non-tender, + BS	  Skin: No rashes, No ecchymoses, No cyanosis	  Neurologic: Non-focal  Extremities: Normal range of motion, No clubbing, cyanosis or edema  Vascular: Peripheral pulses palpable 2+ bilaterally    TELEMETRY: NSR 	    ECG:  sinus tachycardia 	  RADIOLOGY:    < from: US TTE 2D F/U, Limited w/o Contrast (ED) (02.17.20 @ 23:56) >  INTERPRETATION:  A focused transthoracic cardiac ultrasound examination was performed.   No clinically significant pericardial effusion was present.  No global wall motion abnormality was identified.  There was no noted right heart strain.    IMPRESSION:   No clinicallysignificant pericardial effusion.    -----------------------------------------------------------------------------------------------------------------------------------------------------------------------        < from: CT Angio Chest w/ IV Cont (02.17.20 @ 15:20) >  IMPRESSION:     Acute pulmonary embolus in the left distal main pulmonary artery extending into the lobar pulmonary arteries.    Bilateral lower lobe consolidations.    These findings were discussed with Dr. NUZHAT ARAGON [id number] at 2/17/2020 3:36 PM by Dr. Briones with read back confirmation.          < end of copied text >    OTHER: 	  	  LABS:	 	    CARDIAC MARKERS:  Troponin T, High Sensitivity Result: 32 ng/L (02-17 @ 12:01)                                  7.1    12.12 )-----------( 295      ( 18 Feb 2020 06:45 )             23.2     02-17    142  |  103  |  11  ----------------------------<  153<H>  4.0   |  24  |  1.13    Ca    9.4      17 Feb 2020 12:01    TPro  7.0  /  Alb  3.5  /  TBili  0.3  /  DBili  x   /  AST  15  /  ALT  6<L>  /  AlkPhos  92  02-17    PT/INR - ( 17 Feb 2020 12:01 )   PT: 12.0 sec;   INR: 1.05 ratio         PTT - ( 18 Feb 2020 04:31 )  PTT:52.3 sec  proBNP:   Lipid Profile:   HgA1c:   TSH:

## 2020-02-18 NOTE — CONSULT NOTE ADULT - ASSESSMENT
45 year old female with hx of HTN and fibroids s/p abdominal myomectomy  (2/11/20 at [place]) requiring PRBC transfusion introp, presenting with  syncopal episode. Found to have PE    1. Syncope   in the setting of pna/ pe / hypovolemia- anemia    no acs,  HS trop negative; ecg with sinus tachycardia secondary to pna/ acute pe   no active cp or sob   CTA chest with + PE/ bl lower lobe consolidation   bedside echo with no pericardial effusion   no occult arrhythmias identified   check Echo   hep gtt     2. Acute PE   likely provoked from recent surgery  check Echo to r/o RV strain   check LE dopplers r/o DVT   c/w Hep gtt, transition to NOAC when cleared by GYN     3. PNA   ID work up   IV abx

## 2020-02-18 NOTE — CONSULT NOTE ADULT - SUBJECTIVE AND OBJECTIVE BOX
R3 GYN CONSULT NOTE    HPI: 45F with PMH of HTN and uterine fibroids s/p myomectomy (2/11/20) who was brought into the ED for syncopal episode. Patient was recently discharged after uterine myomectomy on 2/13, during which intraop course was c/b blood loss anemia warranting 2u PRBC transfusion. Since discharge, patient had been endorsing dyspnea and labored breathing. On day of admission, patient had gone to bathroom felt significant lightheadedness, headache, and difficulty breathing. She shortly after became lightheaded and passed out in the bathroom, which was the last thing she remembers. Her  promptly called EMS. On their arrival, she was found to be tachycardic tachypneic, and hypoxic despite NRB saturating 80%. Of note also, patient was having ongoing vaginal bleeding. Both her  and she denies fevers, chills, chest pain, limb weakness, or difficulty ambulating.    In ER, patient was found to have PE as well as bilateral lower lobe consolidation. She was transitioned to Bipap mask and started on heparin gtt.  Pt admitted to medicine.  ID, neuro, cards, and pulm consulted.  GYN consulted given hx of recent surgery, recs for starting oral anticoagulation.    Pt had an exlap abd myomectomy, per chart review, EBL was 800cc, pt received 2u pRBC intraoperatively.  Pt was discharged on POD#2.    Pt states since in the ED, symptoms have resolved.  No longer feels SOB, denies CP, fevers, chills, nausea, vomiting, urinary symptoms, is having BMs.  At home, aside from the SOB was recovering well, stated was not having any abd pain, and was having light vaginal bleeding, nothing heavier than her usual period, was ambulating, tolerating PO.  States period started on day of surgery.    States was supposed to go to post-op visit w Dr. Goldberg today to have staples removed.  Requesting staples be removed in the hospital.    GYN: Dr. Goldberg ([facility])    OB/GYN HISTORY: G0; hx of fibroids, s/p recent myomectomy, denies hx of ov cysts, abnl pap smears, STIs    Surgical History:    History of myomectomy  denies any other surgery    Past Medical History:   Hypertension  denies any hx of blood clots    FAMILY HISTORY:  FH: diabetes mellitus  Family history of hypertension: father and mother    No Known Allergies  plastic tray only (Unknown)    Meds: atenolol 100mg QD     Social History: denies T/E/D    REVIEW OF SYSTEMS: see HPI, otherwise neg    OBJECTIVE FINDINGS:    Vital Signs Last 24 Hrs  T(C): 37.2 (18 Feb 2020 08:56), Max: 38 (17 Feb 2020 12:03)  T(F): 99 (18 Feb 2020 08:56), Max: 100.4 (17 Feb 2020 12:03)  HR: 71 (18 Feb 2020 09:49) (71 - 105)  BP: 153/97 (18 Feb 2020 08:56) (138/91 - 162/93)  RR: 19 (18 Feb 2020 08:56) (19 - 34)  SpO2: 100% (18 Feb 2020 09:49) (96% - 100%)      PE:  Gen: Comfortable, NAD, w 2L NC on  CV: RRR  Pulm: CTAB  Abd: Soft, NT, non distended  Incision: midline vertical incision c/d/i w staples  Ext: No edema or tenderness bilaterally  Spec Exam: minimal dark clots noted in posterior vaginal vault, cervix nulliparous, wnl, no active bleeding noted on valsalva, no lesions noted  Bimanual: no cmt, no adnexal masses palpated, uterus palpated to approx 16-18wk size, non tender  (Chaperone for exam ED MITCHELL [facility])    LABS:                        7.1    12.12 )-----------( 295      ( 18 Feb 2020 06:45 )             23.2                         7.8    13.09 )-----------( 288      ( 17 Feb 2020 21:39 )             24.3                         8.0    12.31 )-----------( 287      ( 17 Feb 2020 12:01 )             24.8     02-17    142  |  103  |  11  ----------------------------<  153<H>  4.0   |  24  |  1.13    Ca    9.4      17 Feb 2020 12:01    TPro  7.0  /  Alb  3.5  /  TBili  0.3  /  DBili  x   /  AST  15  /  ALT  6<L>  /  AlkPhos  92  02-17    PT/INR - ( 17 Feb 2020 12:01 )   PT: 12.0 sec;   INR: 1.05 ratio         PTT - ( 18 Feb 2020 04:31 )  PTT:52.3 sec      RADIOLOGY & ADDITIONAL STUDIES:  < from: CT Angio Chest w/ IV Cont (02.17.20 @ 15:20) >    EXAM:  CT ANGIO CHEST (W)AW IC                          PROCEDURE DATE:  02/17/2020      INTERPRETATION:  CLINICAL INFORMATION: Hypoxia    COMPARISON: None.    PROCEDURE:   CT Angiography of the Chest.  90 ml of Omnipaque 350 was injected intravenously. 10 ml were discarded.  Sagittal and coronal reformats were performed as well as 3D (MIP) reconstructions.    FINDINGS:    PULMONARY ARTERIES: Acute pulmonary embolus in the left distal main pulmonary artery extending into the lobar pulmonary arteries. The main pulmonary artery is normal in caliber.    LUNGS AND AIRWAYS: Patent central airways.  Consolidations in the bilateral lower lobes. Groundglass opacity in the lingula, linear atelectasis    PLEURA: No pleural effusion.    MEDIASTINUM AND JAMEE: No lymphadenopathy.    VESSELS: The aorta is normal in course and caliber.    HEART: Heart size is normal. No pericardial effusion.    CHEST WALL AND LOWER NECK: Within normal limits.    VISUALIZED UPPER ABDOMEN: Within normal limits.    BONES: Within normal limits.    IMPRESSION:     Acute pulmonary embolus in the left distal main pulmonary artery extending into the lobar pulmonary arteries.    Bilateral lower lobe consolidations.    These findings were discussed with Dr. NUZHAT ARAGON [phone] at 2/17/2020 3:36 PM by Dr. Briones with read back confirmation.    SUZETTE BRIONES M.D., RADIOLOGY RESIDENT  This document has been electronically signed.  LORI BARGER M.D., ATTENDING RADIOLOGIST  This document has been electronically signed. Feb 17 2020  5:20PM      < end of copied text >

## 2020-02-18 NOTE — CONSULT NOTE ADULT - PROBLEM SELECTOR RECOMMENDATION 9
L main PE extending to lobar arteries provoked from recent surgery  -Heparin gtt, transition to oral AC when ok with GYN   -Check LE duplex  -Check full CE  -Check TTE to r/o RV strain   -No episodes of recorded hypotension

## 2020-02-18 NOTE — CONSULT NOTE ADULT - PROBLEM SELECTOR RECOMMENDATION 9
- low suspicion for ongoing intraabdominal bleeding given stable VS and benign abd exam  - vaginal bleeding stable, not likely contributing to anemia; anemia more likely due to acute blood loss during surgery  - continue w heparin gtt, can transition to oral agents per primary team  - continue care per primary team, reconsult gyn prn    Pt seen and d/w Dr. Windy Briones PGY3 - low suspicion for ongoing intraabdominal bleeding given stable VS and benign abd exam  - vaginal bleeding stable, not likely contributing to anemia; anemia more likely due to acute blood loss during surgery  - continue w heparin gtt, can transition to oral agents per primary team  - continue care per primary team, reconsult gyn prn    Pt seen and d/w Dr. Windy Briones PGY3    ADDENDUM: Pt seen at bedside for staples removal.  Uncomplicated, pt tolerated well.  W 2 areas of superficial opening on superior incision and mid incision, otherwise c/d/i, covered w steri strips.  Pt instructed to keep incision clean and dry, can take shower, let water run over incision, pat dry.  Follow-up after hospital discharge w Dr. Goldberg at [place]. DON Briones PGY3

## 2020-02-18 NOTE — CONSULT NOTE ADULT - PROBLEM SELECTOR RECOMMENDATION 9
Patient with syncopal episode at home in the context of hypoxic respiratory failure and tachycardia and anemia of Hb 7 with ongoing vaginal bleeding and PE . Patient currently non focal. Syncopal episode most likely related to PE and hypoxia, low suspcion for neurologic etiology. Continue to manage PE. Please call back if additional questions

## 2020-02-18 NOTE — PROGRESS NOTE ADULT - SUBJECTIVE AND OBJECTIVE BOX
CHIEF COMPLAINT:Patient is a 45y old  Female who presents with a chief complaint of syncope (18 Feb 2020 08:45)    	        PAST MEDICAL & SURGICAL HISTORY:  Hypertension  History of myomectomy          REVIEW OF SYSTEMS:  feels better  NECK: No pain or stiffness  RESPIRATORY: sob better  CARDIOVASCULAR: No chest pain, palpitations, passing out, dizziness, or leg swelling  GASTROINTESTINAL: No abdominal or epigastric pain. No nausea, vomiting, or hematemesis; No diarrhea or constipation. No melena or hematochezia.  GENITOURINARY: No dysuria, frequency, hematuria, or incontinence  NEUROLOGICAL: No headaches, memory loss, loss of strength, numbness, or tremors    MUSCULOSKELETAL: No joint pain or swelling; No muscle, back, or extremity pain    Medications:  MEDICATIONS  (STANDING):  ATENolol  Tablet 100 milliGRAM(s) Oral daily  ferrous    sulfate 325 milliGRAM(s) Oral daily  heparin  Infusion.  Unit(s)/Hr (14 mL/Hr) IV Continuous <Continuous>  senna 2 Tablet(s) Oral at bedtime  sodium chloride 0.9%. 1000 milliLiter(s) (75 mL/Hr) IV Continuous <Continuous>    MEDICATIONS  (PRN):  heparin  Injectable 6500 Unit(s) IV Push every 6 hours PRN For aPTT less than 40  heparin  Injectable 3000 Unit(s) IV Push every 6 hours PRN For aPTT between 40 - 57  oxycodone    5 mG/acetaminophen 325 mG 1 Tablet(s) Oral every 6 hours PRN Moderate Pain (4 - 6)    	    PHYSICAL EXAM:  T(C): 37.2 (02-18-20 @ 08:56), Max: 38 (02-17-20 @ 12:03)  HR: 71 (02-18-20 @ 09:49) (71 - 105)  BP: 153/97 (02-18-20 @ 08:56) (138/91 - 162/93)  RR: 19 (02-18-20 @ 08:56) (19 - 34)  SpO2: 100% (02-18-20 @ 09:49) (96% - 100%)  Wt(kg): --  I&O's Summary      Appearance: Normal	  HEENT:   Normal oral mucosa, PERRL, EOMI	  Lymphatic: No lymphadenopathy  Cardiovascular: Normal S1 S2, No JVD, No murmurs, No edema  Respiratory: dec bs   Psychiatry: A & O x 3, Mood & affect appropriate  Gastrointestinal:  Soft, Non-tender, + BS	  Skin: No rashes, No ecchymoses, No cyanosis	  Neurologic: Non-focal  Extremities: Normal range of motion, No clubbing, cyanosis or edema  Vascular: Peripheral pulses palpable 2+ bilaterally    TELEMETRY: 	    ECG:  	  RADIOLOGY:  OTHER: 	  	  LABS:	 	    CARDIAC MARKERS:                                7.1    12.12 )-----------( 295      ( 18 Feb 2020 06:45 )             23.2     02-17    142  |  103  |  11  ----------------------------<  153<H>  4.0   |  24  |  1.13    Ca    9.4      17 Feb 2020 12:01    TPro  7.0  /  Alb  3.5  /  TBili  0.3  /  DBili  x   /  AST  15  /  ALT  6<L>  /  AlkPhos  92  02-17    proBNP:   Lipid Profile:   HgA1c:   TSH:

## 2020-02-18 NOTE — CONSULT NOTE ADULT - PROBLEM SELECTOR RECOMMENDATION 2
Patient found to be hypoxic in setting of PE. Currently on hepatin gtt and bipap. Address PE and obtain TTE with bubble study to rule out PFO. Low suspicion for embolic phenomena at this time.

## 2020-02-18 NOTE — CONSULT NOTE ADULT - ASSESSMENT
A/P: 45F with PMH of HTN and uterine fibroids s/p myomectomy (2/11/20), BIBEMS for syncope, tachypnea, respiratory distress, found to have acute PE in left distal main pulm artery extending to lobar pulm arteries, started on heparin.    - low suspicion for ongoing intraabdominal bleeding given stable VS and benign abd exam  - vaginal bleeding stable, not likely contributing to anemia; anemia more likely due to acute blood loss during surgery  - continue w heparin gtt, can transition to oral agents per primary team    Will d/w attending  TUNDE Briones PGY3 A/P: 45F with PMH of HTN and uterine fibroids s/p myomectomy (2/11/20), BIBEMS for syncope, tachypnea, respiratory distress, found to have acute PE in left distal main pulm artery extending to lobar pulm arteries, started on heparin.

## 2020-02-18 NOTE — CONSULT NOTE ADULT - ATTENDING COMMENTS
Patient seen and examined.  Agree with above NP note.  patient with recent GYN surgery, syncope with acute PE on CTA with possible PNA   hd/cv stable  cont a/c per pulmo/med  oral a/c per pulmo/med  f/u full echo to eval lv/rv fxn  no acute RHF
45 year old female s/p krystleery last week for myomectomy/uterine fibroid, excess bleeding needing transfusion PRBCs, released home and yesterday at home went to void urine in bathroom and passed out, did not fall/hit head but states she cannot recall after fainting. no witnessed seizure like activity or tongue bite, incontinence with syncope noted. found tachy and hypoxic in ED + ct chest for PE and on hep gtt now    nonfocal normal neuro exam    RECS:  -continue medical management of post op PE  -no active neurological issues  -getting Echo rule out PFO / no suspicion at this time of CNS embolic phenomenon.   -syncope related to underlying new finding of hypoxia from PE and anemia , low H/H s/p recent surgery and blood transfusions  -no inpatient neurodiagnostics indicated at this time  please call back with further neuro concerns    d/w patient and spouse bedside.   d/w Dr Gavin

## 2020-02-18 NOTE — CONSULT NOTE ADULT - ASSESSMENT
45F with PMH of HTN and uterine fibroids s/p  myomectomy (2/11/20) who was brought into the ED for syncopal episode, found to have hypoxic respiratory failure 2.2 PE

## 2020-02-18 NOTE — CONSULT NOTE ADULT - SUBJECTIVE AND OBJECTIVE BOX
Patient is a 45y old  Female who presents with a chief complaint of   HPI:  46yo Female w. pmhx of HTN and fibroids s/p abdominal myomectomy requiring transfusion of packed red blood cells intraoperatively, discharged 2/13/2020 presents to ER for episode of syncope today.    Sudden onset of SOB and lightheadedness, followed by syncope.    EMS arrived on scene patient was tachycardia / tachypneic and sating at 50% ,   .  Patient admits to continued vaginal bleeding since surgery, no bleeding or pain at surgical site.    Denies fever, chills, n/v, visual changes, chest pain, hx of PE/dvt, leg swelling. (17 Feb 2020 19:10)      PAST MEDICAL & SURGICAL HISTORY:  Hypertension  History of myomectomy      Social history:    FAMILY HISTORY:  FH: diabetes mellitus  Family history of hypertension: father and mother    REVIEW OF SYSTEMS  General:	Denies any malaise fatigue or chills. Fevers absent            Allergic/Immunologic:	No hives or rash   Allergies    No Known Allergies    Intolerances    plastic tray only (Unknown)      Antimicrobials:          Vital Signs Last 24 Hrs  T(C): 36.9 (18 Feb 2020 04:46), Max: 38 (17 Feb 2020 12:03)  T(F): 98.4 (18 Feb 2020 04:46), Max: 100.4 (17 Feb 2020 12:03)  HR: 73 (18 Feb 2020 05:38) (73 - 120)  BP: 152/85 (18 Feb 2020 04:46) (138/91 - 163/102)  BP(mean): --  RR: 22 (18 Feb 2020 04:46) (22 - 34)  SpO2: 100% (18 Feb 2020 05:38) (80% - 100%)    PHYSICAL EXAM:Pleasant patient in no acute distress.           Eyes:PERRL EOMI.NO discharge or conjunctival injection    ENMT:No sinus tenderness.No thrush.No pharyngeal exudate or erythema.Fair dental hygiene    Neck:Supple,No LN,no JVD      Respiratory:Good air entry bilaterally,CTA    Cardiovascular:S1 S2 wnl, No murmurs,rub or gallops    Gastrointestinal:Soft BS(+) no tenderness no masses ,No rebound or guarding    Genitourinary:No CVA tendereness     Rectal:    Extremities:No cyanosis,clubbing or edema.    Vascular:peripheral pulses felt                                   7.1    12.12 )-----------( 295      ( 18 Feb 2020 06:45 )             23.2         02-17    142  |  103  |  11  ----------------------------<  153<H>  4.0   |  24  |  1.13    Ca    9.4      17 Feb 2020 12:01    TPro  7.0  /  Alb  3.5  /  TBili  0.3  /  DBili  x   /  AST  15  /  ALT  6<L>  /  AlkPhos  92  02-17      RECENT CULTURES:      MICROBIOLOGY:          Radiology:      Assessment:        Recommendations and Plan:    Pager [phone]  After 5 pm/weekends or if no response :[phone]

## 2020-02-18 NOTE — CONSULT NOTE ADULT - SUBJECTIVE AND OBJECTIVE BOX
PULMONARY CONSULT      HPI: 44 y/o F with PMH of HTN, recent myomectomy (2/11/20 at [place]) requiring PRBC transfusion introp, discharged from [facility] on 2/13 and presents to ER 2/17 with syncopal episode. Found to have PE. Patient states she was having shortness of breath shortly after discharge and cough with thick white phlegm. No fevers but +chills. No LE edema. Denies pleuritic CP, palpitations. Denies hemoptysis. No history of VTE. No recent travel. Required bipap overnight in ER for dyspnea, now off. Breathing comfortably 98% on 3L NC, 91% on RA.       PAST MEDICAL & SURGICAL HISTORY:  Hypertension  History of myomectomy    Allergies    No Known Allergies    Intolerances    plastic tray only (Unknown)    FAMILY HISTORY:  FH: diabetes mellitus  Family history of hypertension: father and mother    Social history: Never smoker  Em[place]rated from [facility]     Review of Systems:  CONSTITUTIONAL: No fever, chills, or fatigue  EYES: No eye pain, visual disturbances, or discharge  ENMT:  No difficulty hearing, tinnitus, vertigo; No sinus or throat pain  NECK: No pain or stiffness  RESPIRATORY: Per above  CARDIOVASCULAR: No chest pain, palpitations, dizziness, or leg swelling  GASTROINTESTINAL: No abdominal or epigastric pain. No nausea, vomiting, or hematemesis; No diarrhea or constipation. No melena or hematochezia.  GENITOURINARY: No dysuria, frequency, hematuria, or incontinence  NEUROLOGICAL: No headaches, memory loss, loss of strength, numbness, or tremors  SKIN: No itching, burning, rashes, or lesions   MUSCULOSKELETAL: No joint pain or swelling; No muscle, back, or extremity pain  PSYCHIATRIC: No depression, anxiety, mood swings, or difficulty sleeping      Medications:  MEDICATIONS  (STANDING):  ATENolol  Tablet 100 milliGRAM(s) Oral daily  ferrous    sulfate 325 milliGRAM(s) Oral daily  heparin  Infusion.  Unit(s)/Hr (14 mL/Hr) IV Continuous <Continuous>  senna 2 Tablet(s) Oral at bedtime  sodium chloride 0.9%. 1000 milliLiter(s) (75 mL/Hr) IV Continuous <Continuous>    MEDICATIONS  (PRN):  heparin  Injectable 6500 Unit(s) IV Push every 6 hours PRN For aPTT less than 40  heparin  Injectable 3000 Unit(s) IV Push every 6 hours PRN For aPTT between 40 - 57  oxycodone    5 mG/acetaminophen 325 mG 1 Tablet(s) Oral every 6 hours PRN Moderate Pain (4 - 6)            Vital Signs Last 24 Hrs  T(C): 37.2 (18 Feb 2020 08:56), Max: 37.2 (17 Feb 2020 20:42)  T(F): 99 (18 Feb 2020 08:56), Max: 99 (17 Feb 2020 20:42)  HR: 71 (18 Feb 2020 09:49) (71 - 100)  BP: 153/97 (18 Feb 2020 08:56) (138/91 - 162/93)  BP(mean): --  RR: 19 (18 Feb 2020 08:56) (19 - 34)  SpO2: 100% (18 Feb 2020 09:49) (96% - 100%) on 3L NC      VBG pH 7.46 02-17 @ 12:01  VBG pCO2 39 02-17 @ 12:01  VBG O2 sat 22 02-17 @ 12:01  VBG lactate 2.7 02-17 @ 12:01            LABS:                        9.0    11.22 )-----------( 293      ( 18 Feb 2020 11:45 )             29.0     02-17    142  |  103  |  11  ----------------------------<  153<H>  4.0   |  24  |  1.13    Ca    9.4      17 Feb 2020 12:01    TPro  7.0  /  Alb  3.5  /  TBili  0.3  /  DBili  x   /  AST  15  /  ALT  6<L>  /  AlkPhos  92  02-17          CAPILLARY BLOOD GLUCOSE        PT/INR - ( 17 Feb 2020 12:01 )   PT: 12.0 sec;   INR: 1.05 ratio         PTT - ( 18 Feb 2020 04:31 )  PTT:52.3 sec          Physical Examination:    General: No acute distress.      HEENT: Pupils equal, reactive to light.  Symmetric.    PULM: Bronchial BS bibasilar     CVS: S1, S2    ABD: Midline surgical scar, well approximated, staples in place    EXT: No edema, nontender    SKIN: Warm and well perfused, no rashes noted.    NEURO: Alert, oriented, interactive, nonfocal    RADIOLOGY REVIEWED  CTA chest: < from: CT Angio Chest w/ IV Cont (02.17.20 @ 15:20) >  NDINGS:    PULMONARY ARTERIES: Acute pulmonary embolus in the left distal main pulmonary artery extending into the lobar pulmonary arteries. The main pulmonary artery is normal in caliber.    LUNGS AND AIRWAYS: Patent central airways.  Consolidations in the bilateral lower lobes. Groundglass opacity in the lingula, linear atelectasis    PLEURA: No pleural effusion.    MEDIASTINUM AND JAMEE: No lymphadenopathy.    VESSELS: The aorta is normal in course and caliber.    HEART: Heart size is normal. No pericardial effusion.    CHEST WALL AND LOWER NECK: Within normal limits.    VISUALIZED UPPER ABDOMEN: Within normal limits.    BONES: Within normal limits.    IMPRESSION:     Acute pulmonary embolus in the left distal main pulmonary artery extending into the lobar pulmonary arteries.    Bilateral lower lobe consolidations.    These findings were discussed with Dr. NUZHAT ARAGON [phone] at 2/17/2020 3:36 PM by Dr. Briones with read back confirmation.    < end of copied text >

## 2020-02-19 ENCOUNTER — APPOINTMENT (OUTPATIENT)
Dept: OBGYN | Facility: CLINIC | Age: 45
End: 2020-02-19

## 2020-02-19 DIAGNOSIS — J98.11 ATELECTASIS: ICD-10-CM

## 2020-02-19 LAB
APPEARANCE UR: CLEAR — SIGNIFICANT CHANGE UP
BILIRUB UR-MCNC: NEGATIVE — SIGNIFICANT CHANGE UP
COLOR SPEC: COLORLESS — SIGNIFICANT CHANGE UP
DIFF PNL FLD: ABNORMAL
GLUCOSE UR QL: NEGATIVE — SIGNIFICANT CHANGE UP
HCT VFR BLD CALC: 24.8 % — LOW (ref 34.5–45)
HGB BLD-MCNC: 7.8 G/DL — LOW (ref 11.5–15.5)
KETONES UR-MCNC: NEGATIVE — SIGNIFICANT CHANGE UP
LDH SERPL L TO P-CCNC: 541 U/L — HIGH (ref 50–242)
LEUKOCYTE ESTERASE UR-ACNC: NEGATIVE — SIGNIFICANT CHANGE UP
MCHC RBC-ENTMCNC: 27.5 PG — SIGNIFICANT CHANGE UP (ref 27–34)
MCHC RBC-ENTMCNC: 31.5 GM/DL — LOW (ref 32–36)
MCV RBC AUTO: 87.3 FL — SIGNIFICANT CHANGE UP (ref 80–100)
NITRITE UR-MCNC: NEGATIVE — SIGNIFICANT CHANGE UP
NRBC # BLD: 0 /100 WBCS — SIGNIFICANT CHANGE UP (ref 0–0)
PH UR: 7 — SIGNIFICANT CHANGE UP (ref 5–8)
PLATELET # BLD AUTO: 339 K/UL — SIGNIFICANT CHANGE UP (ref 150–400)
PROT UR-MCNC: NEGATIVE — SIGNIFICANT CHANGE UP
RBC # BLD: 2.84 M/UL — LOW (ref 3.8–5.2)
RBC # FLD: 16.3 % — HIGH (ref 10.3–14.5)
SP GR SPEC: 1.01 — LOW (ref 1.01–1.02)
UROBILINOGEN FLD QL: NEGATIVE — SIGNIFICANT CHANGE UP
WBC # BLD: 11.08 K/UL — HIGH (ref 3.8–10.5)
WBC # FLD AUTO: 11.08 K/UL — HIGH (ref 3.8–10.5)

## 2020-02-19 PROCEDURE — 93970 EXTREMITY STUDY: CPT | Mod: 26

## 2020-02-19 PROCEDURE — 93306 TTE W/DOPPLER COMPLETE: CPT | Mod: 26

## 2020-02-19 PROCEDURE — G0452: CPT | Mod: 26

## 2020-02-19 RX ORDER — ACETAMINOPHEN 500 MG
650 TABLET ORAL ONCE
Refills: 0 | Status: COMPLETED | OUTPATIENT
Start: 2020-02-19 | End: 2020-02-19

## 2020-02-19 RX ADMIN — SODIUM CHLORIDE 75 MILLILITER(S): 9 INJECTION INTRAMUSCULAR; INTRAVENOUS; SUBCUTANEOUS at 05:46

## 2020-02-19 RX ADMIN — OXYCODONE AND ACETAMINOPHEN 1 TABLET(S): 5; 325 TABLET ORAL at 21:28

## 2020-02-19 RX ADMIN — APIXABAN 10 MILLIGRAM(S): 2.5 TABLET, FILM COATED ORAL at 17:32

## 2020-02-19 RX ADMIN — Medication 650 MILLIGRAM(S): at 18:20

## 2020-02-19 RX ADMIN — OXYCODONE AND ACETAMINOPHEN 1 TABLET(S): 5; 325 TABLET ORAL at 20:57

## 2020-02-19 RX ADMIN — Medication 260 MILLIGRAM(S): at 22:42

## 2020-02-19 RX ADMIN — APIXABAN 10 MILLIGRAM(S): 2.5 TABLET, FILM COATED ORAL at 05:21

## 2020-02-19 RX ADMIN — Medication 650 MILLIGRAM(S): at 23:10

## 2020-02-19 RX ADMIN — Medication 650 MILLIGRAM(S): at 17:32

## 2020-02-19 RX ADMIN — ATENOLOL 100 MILLIGRAM(S): 25 TABLET ORAL at 05:21

## 2020-02-19 RX ADMIN — SENNA PLUS 2 TABLET(S): 8.6 TABLET ORAL at 21:07

## 2020-02-19 RX ADMIN — Medication 325 MILLIGRAM(S): at 13:20

## 2020-02-19 NOTE — PROGRESS NOTE ADULT - ASSESSMENT
pt w/ pulm embolus after sx  resp insuff  improved   iv heparin  pulm eval noted  lower ext dvt      n/c o2  echo  c/w atenolol   cards eval noted  heme eval

## 2020-02-19 NOTE — PROGRESS NOTE ADULT - SUBJECTIVE AND OBJECTIVE BOX
CHIEF COMPLAINT:Patient is a 45y old  Female who presents with a chief complaint of syncope (18 Feb 2020 08:45)    	        PAST MEDICAL & SURGICAL HISTORY:  Hypertension  History of myomectomy          REVIEW OF SYSTEMS:  CONSTITUTIONAL: No fever, weight loss, or fatigue  EYES: No eye pain, visual disturbances, or discharge  NECK: No pain or stiffness  RESPIRATORY: dec sob   CARDIOVASCULAR: No chest pain, palpitations, passing out, dizziness, or leg swelling  GASTROINTESTINAL: No abdominal or epigastric pain. No nausea, vomiting, or hematemesis; No diarrhea or constipation. No melena or hematochezia.  GENITOURINARY: No dysuria, frequency, hematuria, or incontinence  NEUROLOGICAL: No headaches, memory loss, loss of strength, numbness, or tremo    Medications:  MEDICATIONS  (STANDING):  apixaban 10 milliGRAM(s) Oral every 12 hours  ATENolol  Tablet 100 milliGRAM(s) Oral daily  ferrous    sulfate 325 milliGRAM(s) Oral daily  senna 2 Tablet(s) Oral at bedtime  sodium chloride 0.9%. 1000 milliLiter(s) (75 mL/Hr) IV Continuous <Continuous>    MEDICATIONS  (PRN):  acetaminophen   Tablet .. 650 milliGRAM(s) Oral every 6 hours PRN Temp greater or equal to 38C (100.4F)  oxycodone    5 mG/acetaminophen 325 mG 1 Tablet(s) Oral every 6 hours PRN Moderate Pain (4 - 6)    	    PHYSICAL EXAM:  T(C): 37.5 (02-19-20 @ 10:55), Max: 38.2 (02-18-20 @ 16:57)  HR: 100 (02-19-20 @ 10:55) (85 - 100)  BP: 145/80 (02-19-20 @ 10:55) (136/89 - 151/86)  RR: 18 (02-19-20 @ 10:55) (17 - 18)  SpO2: 97% (02-19-20 @ 10:55) (95% - 100%)  Wt(kg): --  I&O's Summary    18 Feb 2020 07:01  -  19 Feb 2020 07:00  --------------------------------------------------------  IN: 1425 mL / OUT: 0 mL / NET: 1425 mL    19 Feb 2020 07:01  -  19 Feb 2020 14:02  --------------------------------------------------------  IN: 650 mL / OUT: 0 mL / NET: 650 mL        Appearance: Normal	  HEENT:   Normal oral mucosa, PERRL, EOMI	  Lymphatic: No lymphadenopathy  Cardiovascular: Normal S1 S2, No JVD, No murmurs, No edema  Respiratory: Lungs clear to auscultation	  Psychiatry: A & O x 3, Mood & affect appropriate  Gastrointestinal:  Soft, Non-tender, + BS	  Skin: No rashes, No ecchymoses, No cyanosis	  Neurologic: Non-focal  Extremities: Normal range of motion, No clubbing, cyanosis or edema  Vascular: Peripheral pulses palpable 2+ bilaterally    TELEMETRY: 	    ECG:  	  RADIOLOGY:  OTHER: 	  	  LABS:	 	    CARDIAC MARKERS:  CARDIAC MARKERS ( 18 Feb 2020 11:45 )  x     / x     / 54 U/L / x     / 1.0 ng/mL                                7.8    11.08 )-----------( 339      ( 19 Feb 2020 06:20 )             24.8           proBNP:   Lipid Profile:   HgA1c:   TSH:

## 2020-02-19 NOTE — PROGRESS NOTE ADULT - SUBJECTIVE AND OBJECTIVE BOX
CARDIOLOGY FOLLOW UP - Dr. Lakhani    CC no cp or sob       PHYSICAL EXAM:  T(C): 37.5 (02-19-20 @ 10:55), Max: 38.2 (02-18-20 @ 16:57)  HR: 100 (02-19-20 @ 10:55) (85 - 100)  BP: 145/80 (02-19-20 @ 10:55) (136/89 - 151/86)  RR: 18 (02-19-20 @ 10:55) (17 - 18)  SpO2: 97% (02-19-20 @ 10:55) (95% - 100%)  Wt(kg): --  I&O's Summary    18 Feb 2020 07:01  -  19 Feb 2020 07:00  --------------------------------------------------------  IN: 1425 mL / OUT: 0 mL / NET: 1425 mL    19 Feb 2020 07:01  -  19 Feb 2020 13:23  --------------------------------------------------------  IN: 650 mL / OUT: 0 mL / NET: 650 mL        Appearance: Normal	  Cardiovascular: Normal S1 S2,RRR, No JVD, No murmurs  Respiratory: Lungs clear to auscultation	  Gastrointestinal:  Soft, Non-tender, + BS	  Extremities: Normal range of motion, No clubbing, cyanosis or edema        MEDICATIONS  (STANDING):  apixaban 10 milliGRAM(s) Oral every 12 hours  ATENolol  Tablet 100 milliGRAM(s) Oral daily  ferrous    sulfate 325 milliGRAM(s) Oral daily  senna 2 Tablet(s) Oral at bedtime  sodium chloride 0.9%. 1000 milliLiter(s) (75 mL/Hr) IV Continuous <Continuous>      TELEMETRY: nsr- sinus tachycardia Hr up 120	    ECG:  	  RADIOLOGY:   DIAGNOSTIC TESTING:  [ ] Echocardiogram:  [ ]  Catheterization:  [ ] Stress Test:    OTHER: 	  < from: [facility] Duplex Lower Ext Vein Scan, Bilat (02.19.20 @ 09:21) >  IMPRESSION:     There is acute right calf vein DVT affecting the posterior tibial and soleal veins.    There is acute left calf vein DVT affecting of the 2 paired posterior tibial veins.      < end of copied text >    LABS:	 	    Troponin T, High Sensitivity Result: 20 ng/L [0 - 51] (02-18 @ 11:45)  Creatine Kinase, Serum: 54 U/L [25 - 170] (02-18 @ 11:45)  CKMB Units: 1.0 ng/mL [0.0 - 3.8] (02-18 @ 11:45)  Troponin T, High Sensitivity Result: 32 ng/L [0 - 51] (02-17 @ 12:01)                          7.8    11.08 )-----------( 339      ( 19 Feb 2020 06:20 )             24.8           PTT - ( 18 Feb 2020 11:45 )  PTT:33.6 sec

## 2020-02-19 NOTE — PROGRESS NOTE ADULT - SUBJECTIVE AND OBJECTIVE BOX
Follow-up Pulm Progress Note    No new respiratory events overnight.  Denies SOB/CP.   91% on RA at rest, improves to 98% with deep breathing    Medications:  MEDICATIONS  (STANDING):  apixaban 10 milliGRAM(s) Oral every 12 hours  ATENolol  Tablet 100 milliGRAM(s) Oral daily  ferrous    sulfate 325 milliGRAM(s) Oral daily  senna 2 Tablet(s) Oral at bedtime  sodium chloride 0.9%. 1000 milliLiter(s) (75 mL/Hr) IV Continuous <Continuous>    MEDICATIONS  (PRN):  acetaminophen   Tablet .. 650 milliGRAM(s) Oral every 6 hours PRN Temp greater or equal to 38C (100.4F)  oxycodone    5 mG/acetaminophen 325 mG 1 Tablet(s) Oral every 6 hours PRN Moderate Pain (4 - 6)          Vital Signs Last 24 Hrs  T(C): 37.5 (19 Feb 2020 10:55), Max: 38.2 (18 Feb 2020 16:57)  T(F): 99.5 (19 Feb 2020 10:55), Max: 100.8 (18 Feb 2020 16:57)  HR: 100 (19 Feb 2020 10:55) (85 - 100)  BP: 145/80 (19 Feb 2020 10:55) (136/89 - 151/86)  BP(mean): --  RR: 18 (19 Feb 2020 10:55) (17 - 18)  SpO2: 97% (19 Feb 2020 10:55) (95% - 100%) on RA      VBG pH 7.41 02-18 @ 11:45    VBG pCO2 44 02-18 @ 11:45    VBG O2 sat 39 02-18 @ 11:45    VBG lactate 2.6 02-18 @ 11:45      02-18 @ 07:01  -  02-19 @ 07:00  --------------------------------------------------------  IN: 1425 mL / OUT: 0 mL / NET: 1425 mL          LABS:                        7.8    11.08 )-----------( 339      ( 19 Feb 2020 06:20 )             24.8             CARDIAC MARKERS ( 18 Feb 2020 11:45 )  x     / x     / 54 U/L / x     / 1.0 ng/mL      CAPILLARY BLOOD GLUCOSE        PTT - ( 18 Feb 2020 11:45 )  PTT:33.6 sec      Serum Pro-Brain Natriuretic Peptide: 178 pg/mL (02-18-20 @ 11:45)                CULTURES: (if applicable)  Culture Results:   No growth to date. (02-17 @ 15:10)  Culture Results:   No growth to date. (02-17 @ 15:10)    Most recent blood culture -- 02-17 @ 15:10   -- -- .Blood Blood-Venous 02-17 @ 15:10        Physical Examination:  PULM: Crackles bibasilar   CVS: S1, S2 heard    RADIOLOGY REVIEWED  CTA chest: < from: CT Angio Chest w/ IV Cont (02.17.20 @ 15:20) >  FINDINGS:    PULMONARY ARTERIES: Acute pulmonary embolus in the left distal main pulmonary artery extending into the lobar pulmonary arteries. The main pulmonary artery is normal in caliber.    LUNGS AND AIRWAYS: Patent central airways.  Consolidations in the bilateral lower lobes. Groundglass opacity in the lingula, linear atelectasis    PLEURA: No pleural effusion.    MEDIASTINUM AND JAMEE: No lymphadenopathy.    VESSELS: The aorta is normal in course and caliber.    HEART: Heart size is normal. No pericardial effusion.    CHEST WALL AND LOWER NECK: Within normal limits.    VISUALIZED UPPER ABDOMEN: Within normal limits.    BONES: Within normal limits.    IMPRESSION:     Acute pulmonary embolus in the left distal main pulmonary artery extending into the lobar pulmonary arteries.    Bilateral lower lobe consolidations.    < end of copied text >      VA Duplex: < from: [facility] Duplex Lower Ext Vein Scan, Bilat (02.19.20 @ 09:21) >  FINDINGS:    There is normal compressibility of the bilateral common femoral, femoral and popliteal veins.     Doppler examination shows normal spontaneous and phasic flow.    There is acute right calf vein DVT affecting the posterior tibial and soleal veins.    There is acute left calf vein DVT affecting of the 2 paired posterior tibial veins.    IMPRESSION:     There is acute right calf vein DVT affecting the posterior tibial and soleal veins.    There is acute left calf vein DVT affecting of the 2 paired posterior tibial veins.    < end of copied text >

## 2020-02-19 NOTE — PROGRESS NOTE ADULT - PROBLEM SELECTOR PLAN 1
L main PE extending to lobar arteries and bilateral calf DVT provoked from recent surgery  -c/w Eliquis  -Trend H/H  -Check TTE to r/o RV strain   -Cardiac enzymes negative  -No episodes of recorded hypotension.

## 2020-02-19 NOTE — CONSULT NOTE ADULT - ASSESSMENT
44 yo female originally from [facility] admitted with SOB / syncope after recent surgery and found to have bilateral calf DVT and left sided pulmonary embolus    1. DVT /PE   - likely provoked by recent surgery  - feeling better, tolerating anticoagulation, continue Eliquis 10mg BID for now  - will send hypercoagulable workup and if APLS positive, will switch to coumadin, otherwise can be discharged when stable and follow up in the office  - mammogram 10/2019 - recommending additional images of left breast- needs outpatient follow up    2. normocytic anemia  - likely iron deficiency due to heavy GYN bleeding  - will send anemia workup - may benefit from IV iron infusions before discharge  - monitor CBC    3. abnormal mammogram  - as above - needs further imaging

## 2020-02-19 NOTE — CONSULT NOTE ADULT - SUBJECTIVE AND OBJECTIVE BOX
Chief Complaint:  hypoxia / syncope - now without complaints    HPI:  46yo Female w. pmhx of HTN and fibroids s/p abdominal myomectomy on 2/11 requiring transfusion of packed red blood cells intraoperatively, discharged 2/13/2020 presents to ER for episode of syncope today.  Sudden onset of SOB and lightheadedness, followed by syncope. EMS arrived on scene patient was tachycardia / tachypneic and sating at 50%. Patient admits to continued vaginal bleeding since surgery, no bleeding or pain at surgical site. Denies fever, chills, n/v, visual changes, chest pain, hx of PE/dvt, leg swelling. (17 Feb 2020 19:10)   at bedside today. Started on AC with heparin / Eliquis. Patient reports feeling better, not requiring NC oxygen. Denies CP. Denies any GI or GYN bleeding.  No personal or FH of clotting.        PAST MEDICAL & SURGICAL HISTORY:  Hypertension  History of myomectomy      SOCIAL HISTORY:  Smoking - Non smoker   Alcohol - Social  Drugs - No drug use    FAMILY HISTORY:  FH: diabetes mellitus  Family history of hypertension: father and mother      MEDICATIONS  (STANDING):  apixaban 10 milliGRAM(s) Oral every 12 hours  ATENolol  Tablet 100 milliGRAM(s) Oral daily  ferrous    sulfate 325 milliGRAM(s) Oral daily  senna 2 Tablet(s) Oral at bedtime  sodium chloride 0.9%. 1000 milliLiter(s) (75 mL/Hr) IV Continuous <Continuous>    MEDICATIONS  (PRN):  acetaminophen   Tablet .. 650 milliGRAM(s) Oral every 6 hours PRN Temp greater or equal to 38C (100.4F)  oxycodone    5 mG/acetaminophen 325 mG 1 Tablet(s) Oral every 6 hours PRN Moderate Pain (4 - 6)      Allergies    No Known Allergies    Intolerances        Vital Signs Last 24 Hrs  T(C): 37.5 (19 Feb 2020 10:55), Max: 38.2 (18 Feb 2020 16:57)  T(F): 99.5 (19 Feb 2020 10:55), Max: 100.8 (18 Feb 2020 16:57)  HR: 99 (19 Feb 2020 13:59) (85 - 100)  BP: 136/85 (19 Feb 2020 13:59) (136/85 - 149/94)  BP(mean): --  RR: 18 (19 Feb 2020 13:59) (18 - 18)  SpO2: 95% (19 Feb 2020 13:59) (95% - 100%)    PHYSICAL EXAM  General: NAD  HEENT: clear oropharynx, anicteric sclera, pink conjunctiva  Neck: supple  CV: normal S1/S2 with no murmur rubs or gallops  Lungs: crackles at bases  Abdomen: soft non-tender non-distended, no hepatosplenomegaly, positive bowel sounds  Ext: no clubbing cyanosis or edema  Skin: no rashes and no petechiae  Lymph Nodes: No LAD in axillae, groin, neck  Neuro: alert and oriented X 3, no focal deficits    LABS:                          7.8    11.08 )-----------( 339      ( 19 Feb 2020 06:20 )             24.8         Mean Cell Volume : 87.3 fl  Mean Cell Hemoglobin : 27.5 pg  Mean Cell Hemoglobin Concentration : 31.5 gm/dL  Auto Neutrophil # : x  Auto Lymphocyte # : x  Auto Monocyte # : x  Auto Eosinophil # : x  Auto Basophil # : x  Auto Neutrophil % : x  Auto Lymphocyte % : x  Auto Monocyte % : x  Auto Eosinophil % : x  Auto Basophil % : x      Serial CBC's  02-19 @ 06:20  Hct-24.8 / Hgb-7.8 / Plat-339 / RBC-2.84 / WBC-11.08  Serial CBC's  02-18 @ 11:45  Hct-29.0 / Hgb-9.0 / Plat-293 / RBC-3.27 / WBC-11.22  Serial CBC's  02-18 @ 06:45  Hct-23.2 / Hgb-7.1 / Plat-295 / RBC-2.64 / WBC-12.12  Serial CBC's  02-17 @ 21:39  Hct-24.3 / Hgb-7.8 / Plat-288 / RBC-2.81 / WBC-13.09  Serial CBC's  02-17 @ 12:01  Hct-24.8 / Hgb-8.0 / Plat-287 / RBC-2.88 / WBC-12.31              PTT - ( 18 Feb 2020 11:45 )  PTT:33.6 sec      < from: [facility] Duplex Lower Ext Vein Scan, Bilat (02.19.20 @ 09:21) >  IMPRESSION:     There is acute right calf vein DVT affecting the posterior tibial and soleal veins.    There is acute left calf vein DVT affecting of the 2 paired posterior tibial veins.    NP Yesicaa notified.    < end of copied text >    < from: CT Angio Chest w/ IV Cont (02.17.20 @ 15:20) >  Acute pulmonary embolus in the left distal main pulmonary artery extending into the lobar pulmonary arteries.    Bilateral lower lobe consolidations.    < end of copied text >      mammogram 10/2019  IMPRESSION:    Mass in the left breast, possible lymph node. Recommend targeted   diagnostic left breast ultrasound for further evaluation.

## 2020-02-19 NOTE — PROGRESS NOTE ADULT - PROBLEM SELECTOR PLAN 2
Bibasilar dependent atelectasis  ?aspiration event inta-op  -Observe off abx  -Incentive spirometer/OOB, ambulate as tolerated  -Bipap qHS   -CXR in AM

## 2020-02-19 NOTE — PROGRESS NOTE ADULT - ASSESSMENT
44 y/o F with PMH of HTN, recent myomectomy (2/11/20 at [place]) requiring PRBC transfusion introp, discharged from [facility] on 2/13 and presents to ER 2/17 with syncopal episode. Found to have PE. Patient states she was having shortness of breath shortly after discharge and cough with thick white phlegm. CT chest with bibasilar atelectasis

## 2020-02-19 NOTE — PROGRESS NOTE ADULT - ASSESSMENT
45 year old female with hx of HTN and fibroids s/p abdominal myomectomy  (2/11/20 at [place]) requiring PRBC transfusion introp, presenting with  syncopal episode. Found to have PE    1. Syncope   in the setting of pna/ pe / hypovolemia- anemia    no acs,  HS trop negative; ecg with sinus tachycardia secondary to pna/ acute pe   no active cp or sob   CTA chest with + PE/ bl lower lobe consolidation   bedside ED echo with no pericardial effusion   no occult arrhythmias identified   Pending Echo   continue a/c    2. Acute PE / DVT   likely provoked from recent surgery  check Echo to r/o RV strain   LE dopplers with acute bl DVT   now on Eliquis     3. r/o PNA   work up per pulm   s/p IV abx

## 2020-02-19 NOTE — PROVIDER CONTACT NOTE (OTHER) - BACKGROUND
Pt  is a 45yr old female admitted for acute respiratory failure with hypoxia. PMH: HTN, fibroids s/p abdominal myomectomy

## 2020-02-20 DIAGNOSIS — R50.9 FEVER, UNSPECIFIED: ICD-10-CM

## 2020-02-20 LAB
AT III ACT/NOR PPP CHRO: 148 % — HIGH (ref 85–135)
B2 GLYCOPROT1 AB SER QL: NEGATIVE — SIGNIFICANT CHANGE UP
BASOPHILS # BLD AUTO: 0.04 K/UL — SIGNIFICANT CHANGE UP (ref 0–0.2)
BASOPHILS NFR BLD AUTO: 0.3 % — SIGNIFICANT CHANGE UP (ref 0–2)
DRVVT 50/50: 59.9 SEC — SIGNIFICANT CHANGE UP
DRVVT SCREEN TO CONFIRM RATIO: SIGNIFICANT CHANGE UP
EOSINOPHIL # BLD AUTO: 0.2 K/UL — SIGNIFICANT CHANGE UP (ref 0–0.5)
EOSINOPHIL NFR BLD AUTO: 1.3 % — SIGNIFICANT CHANGE UP (ref 0–6)
FERRITIN SERPL-MCNC: 192 NG/ML — HIGH (ref 15–150)
FOLATE SERPL-MCNC: 16.3 NG/ML — SIGNIFICANT CHANGE UP
GRAM STN FLD: SIGNIFICANT CHANGE UP
HAPTOGLOB SERPL-MCNC: 285 MG/DL — HIGH (ref 34–200)
HCT VFR BLD CALC: 22.5 % — LOW (ref 34.5–45)
HGB BLD-MCNC: 7.3 G/DL — LOW (ref 11.5–15.5)
IMM GRANULOCYTES NFR BLD AUTO: 1.8 % — HIGH (ref 0–1.5)
IRON SATN MFR SERPL: 19 UG/DL — LOW (ref 30–160)
IRON SATN MFR SERPL: 8 % — LOW (ref 14–50)
LA NT DPL PPP QL: 110 SEC — SIGNIFICANT CHANGE UP
LYMPHOCYTES # BLD AUTO: 1.95 K/UL — SIGNIFICANT CHANGE UP (ref 1–3.3)
LYMPHOCYTES # BLD AUTO: 12.7 % — LOW (ref 13–44)
MCHC RBC-ENTMCNC: 27.9 PG — SIGNIFICANT CHANGE UP (ref 27–34)
MCHC RBC-ENTMCNC: 32.4 GM/DL — SIGNIFICANT CHANGE UP (ref 32–36)
MCV RBC AUTO: 85.9 FL — SIGNIFICANT CHANGE UP (ref 80–100)
MONOCYTES # BLD AUTO: 1.21 K/UL — HIGH (ref 0–0.9)
MONOCYTES NFR BLD AUTO: 7.9 % — SIGNIFICANT CHANGE UP (ref 2–14)
NEUTROPHILS # BLD AUTO: 11.65 K/UL — HIGH (ref 1.8–7.4)
NEUTROPHILS NFR BLD AUTO: 76 % — SIGNIFICANT CHANGE UP (ref 43–77)
NORMALIZED SCT PPP-RTO: 0.98 RATIO — SIGNIFICANT CHANGE UP (ref 0–1.16)
NORMALIZED SCT PPP-RTO: SIGNIFICANT CHANGE UP
NRBC # BLD: 0 /100 WBCS — SIGNIFICANT CHANGE UP (ref 0–0)
PLATELET # BLD AUTO: 361 K/UL — SIGNIFICANT CHANGE UP (ref 150–400)
PROCALCITONIN SERPL-MCNC: 0.17 NG/ML — HIGH (ref 0.02–0.1)
PROT C ACT/NOR PPP: 117 % — SIGNIFICANT CHANGE UP (ref 74–150)
PROT S FREE AG PPP IA-ACNC: 80 % — SIGNIFICANT CHANGE UP (ref 61–131)
RAPID RVP RESULT: SIGNIFICANT CHANGE UP
RBC # BLD: 2.62 M/UL — LOW (ref 3.8–5.2)
RBC # FLD: 16.1 % — HIGH (ref 10.3–14.5)
SPECIMEN SOURCE: SIGNIFICANT CHANGE UP
TIBC SERPL-MCNC: 246 UG/DL — SIGNIFICANT CHANGE UP (ref 220–430)
UIBC SERPL-MCNC: 227 UG/DL — SIGNIFICANT CHANGE UP (ref 110–370)
VIT B12 SERPL-MCNC: 564 PG/ML — SIGNIFICANT CHANGE UP (ref 232–1245)
WBC # BLD: 15.33 K/UL — HIGH (ref 3.8–10.5)
WBC # FLD AUTO: 15.33 K/UL — HIGH (ref 3.8–10.5)

## 2020-02-20 PROCEDURE — 71045 X-RAY EXAM CHEST 1 VIEW: CPT | Mod: 26

## 2020-02-20 PROCEDURE — 99232 SBSQ HOSP IP/OBS MODERATE 35: CPT

## 2020-02-20 RX ORDER — ACETAMINOPHEN 500 MG
650 TABLET ORAL ONCE
Refills: 0 | Status: COMPLETED | OUTPATIENT
Start: 2020-02-20 | End: 2020-02-20

## 2020-02-20 RX ORDER — ACETAMINOPHEN 500 MG
650 TABLET ORAL EVERY 6 HOURS
Refills: 0 | Status: DISCONTINUED | OUTPATIENT
Start: 2020-02-20 | End: 2020-02-24

## 2020-02-20 RX ORDER — IRON SUCROSE 20 MG/ML
200 INJECTION, SOLUTION INTRAVENOUS ONCE
Refills: 0 | Status: COMPLETED | OUTPATIENT
Start: 2020-02-20 | End: 2020-02-20

## 2020-02-20 RX ADMIN — APIXABAN 10 MILLIGRAM(S): 2.5 TABLET, FILM COATED ORAL at 05:14

## 2020-02-20 RX ADMIN — OXYCODONE AND ACETAMINOPHEN 1 TABLET(S): 5; 325 TABLET ORAL at 02:35

## 2020-02-20 RX ADMIN — OXYCODONE AND ACETAMINOPHEN 1 TABLET(S): 5; 325 TABLET ORAL at 20:30

## 2020-02-20 RX ADMIN — IRON SUCROSE 110 MILLIGRAM(S): 20 INJECTION, SOLUTION INTRAVENOUS at 11:46

## 2020-02-20 RX ADMIN — SENNA PLUS 2 TABLET(S): 8.6 TABLET ORAL at 22:53

## 2020-02-20 RX ADMIN — OXYCODONE AND ACETAMINOPHEN 1 TABLET(S): 5; 325 TABLET ORAL at 21:00

## 2020-02-20 RX ADMIN — Medication 650 MILLIGRAM(S): at 10:17

## 2020-02-20 RX ADMIN — OXYCODONE AND ACETAMINOPHEN 1 TABLET(S): 5; 325 TABLET ORAL at 03:05

## 2020-02-20 RX ADMIN — ATENOLOL 100 MILLIGRAM(S): 25 TABLET ORAL at 05:14

## 2020-02-20 RX ADMIN — Medication 260 MILLIGRAM(S): at 22:53

## 2020-02-20 RX ADMIN — Medication 650 MILLIGRAM(S): at 09:17

## 2020-02-20 RX ADMIN — APIXABAN 10 MILLIGRAM(S): 2.5 TABLET, FILM COATED ORAL at 17:36

## 2020-02-20 RX ADMIN — Medication 325 MILLIGRAM(S): at 11:47

## 2020-02-20 RX ADMIN — Medication 650 MILLIGRAM(S): at 23:07

## 2020-02-20 RX ADMIN — Medication 650 MILLIGRAM(S): at 14:33

## 2020-02-20 RX ADMIN — Medication 650 MILLIGRAM(S): at 15:33

## 2020-02-20 NOTE — PROVIDER CONTACT NOTE (OTHER) - ASSESSMENT
Pt A&Ox4, VSS as charted, no signs of active bleeding, pt denies SOB and has no s/s of resp distress.

## 2020-02-20 NOTE — PROGRESS NOTE ADULT - ASSESSMENT
45 year old admitted after recent discharge for myomectomy with anemia  admitted for syncope and sob and found to have a PE. chest xray is clear but there are consolidations in lower lobes   chills but no real fever.  no sputum or chest pain   repeat chest x-ray is normal     doubt she had pna  no signs s on exam .    agree with heparin for PE    She looks much better than the other day and is non toxic and comfortable on RA  ambulating    I suspect fever is form  DVT and PE.    Hold ab unless there is a clinical change  discussed with pulmonary

## 2020-02-20 NOTE — PROGRESS NOTE ADULT - SUBJECTIVE AND OBJECTIVE BOX
Follow-up Pulm Progress Note    96% on RA  TMax 39.2  Non-toxic appearing  Denies cough, sputum production, diarrhea, constipation, dysuria, abd pain, rhinorrhea    Medications:  MEDICATIONS  (STANDING):  apixaban 10 milliGRAM(s) Oral every 12 hours  ATENolol  Tablet 100 milliGRAM(s) Oral daily  ferrous    sulfate 325 milliGRAM(s) Oral daily  senna 2 Tablet(s) Oral at bedtime  sodium chloride 0.9%. 1000 milliLiter(s) (75 mL/Hr) IV Continuous <Continuous>    MEDICATIONS  (PRN):  acetaminophen   Tablet .. 650 milliGRAM(s) Oral every 6 hours PRN Temp greater or equal to 38C (100.4F)  oxycodone    5 mG/acetaminophen 325 mG 1 Tablet(s) Oral every 6 hours PRN Moderate Pain (4 - 6)          Vital Signs Last 24 Hrs  T(C): 36.8 (2020 10:39), Max: 39.2 (2020 09:05)  T(F): 98.3 (2020 10:39), Max: 102.5 (2020 09:05)  HR: 88 (2020 10:46) (87 - 105)  BP: 137/84 (2020 09:05) (136/85 - 157/98)  BP(mean): --  RR: 18 (2020 09:05) (18 - 18)  SpO2: 96% (2020 10:46) (95% - 100%) on RA           @ 07:01  -  02- @ 07:00  --------------------------------------------------------  IN: 1490 mL / OUT: 0 mL / NET: 1490 mL          LABS:                        7.3    15.33 )-----------( 361      ( 2020 06:06 )             22.5                 CAPILLARY BLOOD GLUCOSE          Urinalysis Basic - ( 2020 22:31 )    Color: Colorless / Appearance: Clear / S.007 / pH: x  Gluc: x / Ketone: Negative  / Bili: Negative / Urobili: Negative   Blood: x / Protein: Negative / Nitrite: Negative   Leuk Esterase: Negative / RBC: 3 /hpf / WBC 1 /HPF   Sq Epi: x / Non Sq Epi: 1 /hpf / Bacteria: Negative      Procalcitonin, Serum: 0.17 ng/mL (20 @ 06:06)    Serum Pro-Brain Natriuretic Peptide: 178 pg/mL (20 @ 11:45)                CULTURES: (if applicable)  Culture Results:   No growth to date. ( @ 15:10)  Culture Results:   No growth to date. ( @ 15:10)    Most recent blood culture --  @ 15:10   -- -- .Blood Blood-Venous  @ 15:10        Physical Examination:  PULM: Crackles bibasilar   CVS: S1, S2 heard    RADIOLOGY REVIEWED  CXR: Grossly clear, improved atelectasis

## 2020-02-20 NOTE — PROGRESS NOTE ADULT - SUBJECTIVE AND OBJECTIVE BOX
Chief Complaint:  FU    History of Present Illness: reports some right sided CP, no SOB, no palpitations, some cough and some hemoptysis today. Fevers up to 102. No N/V/D/C, no bleeding otherwise. no dysuria    Initial HPI (2/19/20): 44yo Female w. pmhx of HTN and fibroids s/p abdominal myomectomy on 2/11 requiring transfusion of packed red blood cells intraoperatively, discharged 2/13/2020 presents to ER for episode of syncope today.  Sudden onset of SOB and lightheadedness, followed by syncope. EMS arrived on scene patient was tachycardia / tachypneic and sating at 50%. Patient admits to continued vaginal bleeding since surgery, no bleeding or pain at surgical site. Denies fever, chills, n/v, visual changes, chest pain, hx of PE/dvt, leg swelling. (17 Feb 2020 19:10)   at bedside today. Started on AC with heparin / Eliquis. Patient reports feeling better, not requiring NC oxygen. Denies CP. Denies any GI or GYN bleeding.  No personal or FH of clotting.       MEDICATIONS  (STANDING):  apixaban 10 milliGRAM(s) Oral every 12 hours  ATENolol  Tablet 100 milliGRAM(s) Oral daily  ferrous    sulfate 325 milliGRAM(s) Oral daily  senna 2 Tablet(s) Oral at bedtime  sodium chloride 0.9%. 1000 milliLiter(s) (75 mL/Hr) IV Continuous <Continuous>    MEDICATIONS  (PRN):  acetaminophen   Tablet .. 650 milliGRAM(s) Oral every 6 hours PRN Temp greater or equal to 38C (100.4F)  acetaminophen   Tablet .. 650 milliGRAM(s) Oral every 6 hours PRN Moderate Pain (4 - 6)  oxycodone    5 mG/acetaminophen 325 mG 1 Tablet(s) Oral every 6 hours PRN Moderate Pain (4 - 6)      Allergies    No Known Allergies    Intolerances        Vital Signs Last 24 Hrs  T(C): 37.4 (20 Feb 2020 17:38), Max: 39.2 (20 Feb 2020 09:05)  T(F): 99.3 (20 Feb 2020 17:38), Max: 102.5 (20 Feb 2020 09:05)  HR: 99 (20 Feb 2020 17:38) (87 - 105)  BP: 139/81 (20 Feb 2020 17:38) (137/84 - 157/98)  BP(mean): --  RR: 18 (20 Feb 2020 17:38) (18 - 18)  SpO2: 93% (20 Feb 2020 17:38) (93% - 100%)    PHYSICAL EXAM  General: NAD, but appears weak  HEENT: clear oropharynx, anicteric sclera, pink conjunctiva  Neck: supple  CV: normal S1/S2 with no murmur rubs or gallops  Lungs: crackles at bases, no wheezes, no rales  Abdomen: soft non-tender non-distended, no hepatosplenomegaly, positive bowel sounds  Ext: no clubbing cyanosis or edema  Skin: no rashes and no petechiae  Lymph Nodes: No LAD in axillae, neck  Neuro: alert and oriented X 3, no focal deficits    LABS:                          7.3    15.33 )-----------( 361      ( 20 Feb 2020 06:06 )             22.5         Mean Cell Volume : 85.9 fl  Mean Cell Hemoglobin : 27.9 pg  Mean Cell Hemoglobin Concentration : 32.4 gm/dL  Auto Neutrophil # : 11.65 K/uL  Auto Lymphocyte # : 1.95 K/uL  Auto Monocyte # : 1.21 K/uL  Auto Eosinophil # : 0.20 K/uL  Auto Basophil # : 0.04 K/uL  Auto Neutrophil % : 76.0 %  Auto Lymphocyte % : 12.7 %  Auto Monocyte % : 7.9 %  Auto Eosinophil % : 1.3 %  Auto Basophil % : 0.3 %      Serial CBC's  02-20 @ 06:06  Hct-22.5 / Hgb-7.3 / Plat-361 / RBC-2.62 / WBC-15.33  Serial CBC's  02-19 @ 06:20  Hct-24.8 / Hgb-7.8 / Plat-339 / RBC-2.84 / WBC-11.08  Serial CBC's  02-18 @ 11:45  Hct-29.0 / Hgb-9.0 / Plat-293 / RBC-3.27 / WBC-11.22  Serial CBC's  02-18 @ 06:45  Hct-23.2 / Hgb-7.1 / Plat-295 / RBC-2.64 / WBC-12.12  Serial CBC's  02-17 @ 21:39  Hct-24.3 / Hgb-7.8 / Plat-288 / RBC-2.81 / WBC-13.09  Serial CBC's  02-17 @ 12:01  Hct-24.8 / Hgb-8.0 / Plat-287 / RBC-2.88 / WBC-12.31                  Ferritin, Serum: 192 ng/mL (02-20 @ 00:44)  Iron - Total Binding Capacity.: 246 ug/dL (02-20 @ 00:44)  Vitamin B12, Serum: 564 pg/mL (02-20 @ 00:44)  Folate, Serum: 16.3 ng/mL (02-20 @ 00:44)          02-20 @ 00:44    ldh1 --  haptoglobin 285  RENATA--  uric acid--

## 2020-02-20 NOTE — PROGRESS NOTE ADULT - ASSESSMENT
45 year old female with hx of HTN and fibroids s/p abdominal myomectomy  (2/11/20 at [place]) requiring PRBC transfusion introp, presenting with  syncopal episode. Found to have PE    1. Syncope   in the setting of possible pna/  + pe / hypovolemia- anemia    no acs,  HS trop negative; ecg with sinus tachycardia secondary to pna/ acute pe   no active cp or sob   CTA chest with + PE/ bl lower lobe consolidation   bedside ED echo with no pericardial effusion   no occult arrhythmias identified   Echo with nl LV/ RV fx   continue a/c    2. Acute PE / DVT   likely provoked from recent surgery  LE dopplers with acute bl DVT  Echo with nl LV/ RV fx   on Eliquis     3. r/o PNA   increase in WBC/ temp   repeat chest xray noted   work up/ abx  per pulm   pending rvp/ repeat  bcx

## 2020-02-20 NOTE — PROVIDER CONTACT NOTE (OTHER) - BACKGROUND
Pt admitted for Syncope and acute respiratory failure with hypoxia. CT results show PE on eliquis and PNA . Recently D/C on 2/13 after abdominal myoectomy

## 2020-02-20 NOTE — PROGRESS NOTE ADULT - PROBLEM SELECTOR PLAN 1
TMax 39.2, non-toxic appearing  -Clinically without s/s of PNA, improved oxygenation and clear CXR   -UA negative  -f/u blood culture, check RVP  -Possibly 2nd VTE? TMax 39.2, non-toxic appearing  -CTA with bibasilar opacities  -Given high temps and rising leukocytosis would treat with abx for PNA  -UA negative  -f/u blood culture, check RVP

## 2020-02-20 NOTE — PROGRESS NOTE ADULT - ASSESSMENT
46 y/o F with PMH of HTN, recent myomectomy (2/11/20 at [place]) requiring PRBC transfusion introp, discharged from [facility] on 2/13 and presents to ER 2/17 with syncopal episode. Found to have PE. Patient states she was having shortness of breath shortly after discharge and cough with thick white phlegm. CT chest with bibasilar atelectasis

## 2020-02-20 NOTE — PROGRESS NOTE ADULT - SUBJECTIVE AND OBJECTIVE BOX
infectious diseases progress note:    Patient is a 45y old  Female who presents with a chief complaint of syncope (2020 08:45)        Acute respiratory failure with hypoxia        ROS:  CONSTITUTIONAL:  Negative fever or chills, feels well, good appetite  EYES:  Negative  blurry vision or double vision  CARDIOVASCULAR:  Negative for chest pain or palpitations  RESPIRATORY:  Negative for cough, wheezing, or SOB   GASTROINTESTINAL:  Negative for nausea, vomiting, diarrhea, constipation, or abdominal pain  GENITOURINARY:  Negative frequency, urgency or dysuria  NEUROLOGIC:  No headache, confusion, dizziness, lightheadedness    Allergies    No Known Allergies    Intolerances        ANTIBIOTICS/RELEVANT:  antimicrobials    immunologic:    OTHER:  acetaminophen   Tablet .. 650 milliGRAM(s) Oral every 6 hours PRN  apixaban 10 milliGRAM(s) Oral every 12 hours  ATENolol  Tablet 100 milliGRAM(s) Oral daily  ferrous    sulfate 325 milliGRAM(s) Oral daily  oxycodone    5 mG/acetaminophen 325 mG 1 Tablet(s) Oral every 6 hours PRN  senna 2 Tablet(s) Oral at bedtime  sodium chloride 0.9%. 1000 milliLiter(s) IV Continuous <Continuous>      Objective:  Vital Signs Last 24 Hrs  T(C): 36.8 (2020 10:39), Max: 39.2 (2020 09:05)  T(F): 98.3 (2020 10:39), Max: 102.5 (2020 09:05)  HR: 88 (2020 10:46) (87 - 105)  BP: 137/84 (2020 09:05) (136/85 - 157/98)  BP(mean): --  RR: 18 (2020 09:05) (18 - 18)  SpO2: 96% (2020 10:46) (95% - 100%)    PHYSICAL EXAM:  Constitutional:Well-developed, well nourished--no acute distress  Eyes:TASHA, EOMI  Ear/Nose/Throat: no oral lesion, no sinus tenderness on percussion	  Neck:no JVD, no lymphadenopathy, supple  Respiratory: CTA karin  Cardiovascular: S1S2 RRR, no murmurs  Gastrointestinal:soft, (+) BS, no HSM  Extremities:no e/e/c        LABS:                        7.3    15.33 )-----------( 361      ( 2020 06:06 )             22.5             Urinalysis Basic - ( 2020 22:31 )    Color: Colorless / Appearance: Clear / S.007 / pH: x  Gluc: x / Ketone: Negative  / Bili: Negative / Urobili: Negative   Blood: x / Protein: Negative / Nitrite: Negative   Leuk Esterase: Negative / RBC: 3 /hpf / WBC 1 /HPF   Sq Epi: x / Non Sq Epi: 1 /hpf / Bacteria: Negative          MICROBIOLOGY:    RECENT CULTURES:   @ 15:10 .Blood Blood-Venous                No growth to date.          RESPIRATORY CULTURES:              RADIOLOGY & ADDITIONAL STUDIES:        Pager [phone]  After 5 pm/weekends or if no response :[phone]

## 2020-02-20 NOTE — PROGRESS NOTE ADULT - ASSESSMENT
pt w/ pulm embolus after sx  dvt /lower ext  resp insuff  improved   c/w a/c  pulm eval noted  lower ext dvt      n/c o2  echo noted  c/w atenolol   cards eval noted  heme eval noted  anemia  refuses prbcs  iv iron?  fever  id f/u  f/u cultures

## 2020-02-20 NOTE — PROGRESS NOTE ADULT - PROBLEM SELECTOR PLAN 3
Bibasilar dependent atelectasis  ?aspiration event inta-op  -Clinically does not have PNA   -CXR with improved atelectasis   -Improved oxygenation, d/c bipap   -Incentive spirometer/OOB, ambulate as tolerated Bibasilar dependent atelectasis  ?aspiration event inta-op  -Improved oxygenation, d/c bipap   -Incentive spirometer/OOB, ambulate as tolerated

## 2020-02-20 NOTE — CHART NOTE - NSCHARTNOTEFT_GEN_A_CORE
MEDICINE NP    Notified by RN patient with temperature 102.3 . Seen and examined patient at bedside. Patient is AOx3, NAD. Denies HA, CP, SOB, cough, N/V, dysuria, or abd pain. Of note, patient had fever 102 during day shift, unsure if providers were notified.    VITAL SIGNS:  Vital Signs Last 24 Hrs  T(C): 37.7 (19 Feb 2020 23:01), Max: 39.1 (19 Feb 2020 21:25)  T(F): 99.8 (19 Feb 2020 23:01), Max: 102.3 (19 Feb 2020 21:25)  HR: 105 (19 Feb 2020 21:25) (85 - 105)  BP: 157/98 (19 Feb 2020 21:25) (136/85 - 157/98)  BP(mean): --  RR: 18 (19 Feb 2020 21:25) (18 - 18)  SpO2: 96% (19 Feb 2020 21:25) (95% - 100%)      LABORATORY:                          7.8    11.08 )-----------( 339      ( 19 Feb 2020 06:20 )             24.8     MICROBIOLOGY:   Culture - Blood (02.17.20 @ 15:10)    Specimen Source: .Blood Blood-Peripheral    Culture Results: No growth to date.  Culture - Blood (02.17.20 @ 15:10)    Specimen Source: .Blood Blood-Venous    Culture Results: No growth to date.      RADIOLOGY:  2/17/20 CXR: IMPRESSION:   No acute consolidations.    2/17/20 CTA: IMPRESSION:   Acute pulmonary embolus in the left distal main pulmonary artery extending into the lobar pulmonary arteries.  Bilateral lower lobe consolidations.      PHYSICAL EXAM:  Constitutional: AOx3. NAD.  Respiratory: clear lungs bilaterally. No wheezing, rhonchi, or crackles.  Cardiovascular: S1 S2. No murmurs.  Gastrointestinal: BS X4 active. soft. nontender.  Extremities/Vascular: +2 pulses bilaterally. No BLE edema.      ASSESSMENT/PLAN:   44yo Female w. pmhx of HTN and fibroids s/p abdominal myomectomy requiring transfusion of packed red blood cells intraoperatively, discharged 2/13/2020 presents to ER for episode of syncope. Found to have PE/DVT, placed on Eliquis.    Now p/w fever.    Recurrent Fever r/o infectious source, likely from PE  --Tylenol and cooling measures prn for pyrexia  --BC x2, UA/UC, RVP  --CXR ordered for AM by pulmonary  --Spoke ID Dr. Roberson, recommends no antibiotics at this time, procalcitonin for AM, cultures  --F/U primary team in AM    Johana Daley, [facility]  [id number] MEDICINE NP    Notified by RN patient with temperature 102.3 . Seen and examined patient at bedside. Patient is AOx3, NAD. Denies HA, CP, SOB, cough, N/V, dysuria, or abd pain. Of note, patient had fever 102 during day shift, unsure if providers were notified.    VITAL SIGNS:  Vital Signs Last 24 Hrs  T(C): 37.7 (19 Feb 2020 23:01), Max: 39.1 (19 Feb 2020 21:25)  T(F): 99.8 (19 Feb 2020 23:01), Max: 102.3 (19 Feb 2020 21:25)  HR: 105 (19 Feb 2020 21:25) (85 - 105)  BP: 157/98 (19 Feb 2020 21:25) (136/85 - 157/98)  BP(mean): --  RR: 18 (19 Feb 2020 21:25) (18 - 18)  SpO2: 96% (19 Feb 2020 21:25) (95% - 100%)      LABORATORY:                          7.8    11.08 )-----------( 339      ( 19 Feb 2020 06:20 )             24.8     MICROBIOLOGY:   Culture - Blood (02.17.20 @ 15:10)    Specimen Source: .Blood Blood-Peripheral    Culture Results: No growth to date.  Culture - Blood (02.17.20 @ 15:10)    Specimen Source: .Blood Blood-Venous    Culture Results: No growth to date.      RADIOLOGY:  2/17/20 CXR: IMPRESSION:   No acute consolidations.    2/17/20 CTA: IMPRESSION:   Acute pulmonary embolus in the left distal main pulmonary artery extending into the lobar pulmonary arteries.  Bilateral lower lobe consolidations.      PHYSICAL EXAM:  Constitutional: AOx3. NAD.  Respiratory: clear lungs bilaterally. No wheezing, rhonchi, or crackles.  Cardiovascular: S1 S2. No murmurs.  Gastrointestinal: BS X4 active. soft. nontender.  Extremities/Vascular: +2 pulses bilaterally. No BLE edema.      ASSESSMENT/PLAN:   44yo Female w. pmhx of HTN and fibroids s/p abdominal myomectomy requiring transfusion of packed red blood cells intraoperatively, discharged 2/13/2020 presents to ER for episode of syncope. Found to have PE/DVT, placed on Eliquis.    Now p/w fever.    Recurrent Fever r/o infectious source, likely from PE  --Tylenol and cooling measures prn for pyrexia  --BC x2, UA/UC, RVP negative 2/17  --CXR ordered for AM by pulmonary  --Spoke ID Dr. Roberson, recommends no antibiotics at this time, procalcitonin for AM, cultures  --F/U primary team in AM    Johana Daley, [facility]-BC  [id number]

## 2020-02-20 NOTE — PROGRESS NOTE ADULT - SUBJECTIVE AND OBJECTIVE BOX
CARDIOLOGY FOLLOW UP - Dr. Lakhani    CC no cp or sob        PHYSICAL EXAM:  T(C): 36.8 (02-20-20 @ 10:39), Max: 39.2 (02-20-20 @ 09:05)  HR: 88 (02-20-20 @ 10:46) (87 - 105)  BP: 137/84 (02-20-20 @ 09:05) (136/85 - 157/98)  RR: 18 (02-20-20 @ 09:05) (18 - 18)  SpO2: 96% (02-20-20 @ 10:46) (95% - 100%)  Wt(kg): --  I&O's Summary    19 Feb 2020 07:01  -  20 Feb 2020 07:00  --------------------------------------------------------  IN: 1490 mL / OUT: 0 mL / NET: 1490 mL        Appearance: Normal	  Cardiovascular: Normal S1 S2,RRR, No JVD, No murmurs  Respiratory: crackles at base 	  Gastrointestinal:  Soft, Non-tender, + BS	  Extremities: Normal range of motion, No clubbing, cyanosis or edema        MEDICATIONS  (STANDING):  apixaban 10 milliGRAM(s) Oral every 12 hours  ATENolol  Tablet 100 milliGRAM(s) Oral daily  ferrous    sulfate 325 milliGRAM(s) Oral daily  senna 2 Tablet(s) Oral at bedtime  sodium chloride 0.9%. 1000 milliLiter(s) (75 mL/Hr) IV Continuous <Continuous>      TELEMETRY: NSR - sinus tachycardia  	    ECG:  	  RADIOLOGY:   DIAGNOSTIC TESTING:  [ ] Echocardiogram:    < from: Transthoracic Echocardiogram (02.19.20 @ 16:18) >  EF (Visual Estimate): 60-65 %  EF (Wells Rule): 64 %Doppler Peak Velocity (m/sec):  AoV=1.5  ------------------------------------------------------------------------  Observations:  Mitral Valve: Normal mitral valve. Minimal mitral  regurgitation.  Aortic Valve/Aorta: Normal trileaflet aortic valve.Peak  transaortic valve gradient equals 9 mm Hg. Minimal aortic  regurgitation.  Peak left ventricular outflow tract  gradient equals 6 mm Hg.  Aortic Root: 2.6 cm.  Left Atrium: Normal left atrium.  LA volume index = 30  cc/m2.  Left Ventricle: Normal left ventricular systolic function.  No segmental wall motion abnormalities. Normal left  ventricular internal dimensions and wall thicknesses.  Normal diastolic function  Right Heart: Normal right atrium. The right ventricle is  not well visualized; grossly normal right ventricular  systolic function. TV s' = 16 cm/sec. TAPSE = 2.5 cm.  Normal tricuspid valve. Mild tricuspid regurgitation.  Pulmonic valve not well visualized, probably normal. Mild  pulmonic regurgitation.  Pericardium/Pleura: Normal pericardium with no pericardial  effusion.  Hemodynamic: Estimated right atrial pressure is 8 mm Hg.  Estimated right ventricular systolic pressure equals 37 mm  Hg, assuming right atrial pressure equals 8 mm Hg,  consistent with borderline pulmonary hypertension.  ------------------------------------------------------------------------  Conclusions:  1. Normal left ventricular internal dimensions and wall  thicknesses.  2. Normal left ventricular systolic function. No segmental  wall motion abnormalities.  3. The right ventricle is not well visualized; grossly  normal right ventricular systolic function. TV s' = 16  cm/sec. TAPSE = 2.5 cm.  4. Estimated right ventricular systolic pressure equals 37  mm Hg, assuming right atrial pressure equals 8 mm Hg,  consistent with borderline pulmonary hypertension.  *** No previous Echo exam.  ------------------------------------------------------------------------  Confirmed on  2/19/2020 - 19:30:28 by Jorge Cardenas M.D.  ------------------------------------------------------------------------    < end of copied text >  [ ]  Catheterization:  [ ] Stress Test:    OTHER: 	  < from: Xray Chest 1 View- PORTABLE-Routine (02.20.20 @ 09:03) >  Impression:    Clear lungs. No change from prior          < end of copied text >    LABS:	 	    Troponin T, High Sensitivity Result: 20 ng/L [0 - 51] (02-18 @ 11:45)  Creatine Kinase, Serum: 54 U/L [25 - 170] (02-18 @ 11:45)  CKMB Units: 1.0 ng/mL [0.0 - 3.8] (02-18 @ 11:45)  Troponin T, High Sensitivity Result: 32 ng/L [0 - 51] (02-17 @ 12:01)                          7.3    15.33 )-----------( 361      ( 20 Feb 2020 06:06 )             22.5

## 2020-02-20 NOTE — PROGRESS NOTE ADULT - SUBJECTIVE AND OBJECTIVE BOX
CHIEF COMPLAINT:Patient is a 45y old  Female who presents with a chief complaint of syncope (18 Feb 2020 08:45)    	        PAST MEDICAL & SURGICAL HISTORY:  Hypertension  History of myomectomy          REVIEW OF SYSTEMS:  feels better overall  fever over 102 last pm   RESPIRATORY: dec sob  CARDIOVASCULAR: No chest pain, palpitations, passing out, dizziness, or leg swelling  GASTROINTESTINAL: No abdominal or epigastric pain. No nausea, vomiting, or hematemesis; No diarrhea or constipation. No melena or hematochezia.  GENITOURINARY: No dysuria, frequency, hematuria, or incontinence  NEUROLOGICAL: No headaches,     Medications:  MEDICATIONS  (STANDING):  apixaban 10 milliGRAM(s) Oral every 12 hours  ATENolol  Tablet 100 milliGRAM(s) Oral daily  ferrous    sulfate 325 milliGRAM(s) Oral daily  senna 2 Tablet(s) Oral at bedtime  sodium chloride 0.9%. 1000 milliLiter(s) (75 mL/Hr) IV Continuous <Continuous>    MEDICATIONS  (PRN):  acetaminophen   Tablet .. 650 milliGRAM(s) Oral every 6 hours PRN Temp greater or equal to 38C (100.4F)  oxycodone    5 mG/acetaminophen 325 mG 1 Tablet(s) Oral every 6 hours PRN Moderate Pain (4 - 6)    	    PHYSICAL EXAM:  T(C): 36.8 (02-20-20 @ 10:39), Max: 39.2 (02-20-20 @ 09:05)  HR: 88 (02-20-20 @ 10:46) (87 - 105)  BP: 137/84 (02-20-20 @ 09:05) (136/85 - 157/98)  RR: 18 (02-20-20 @ 09:05) (18 - 18)  SpO2: 96% (02-20-20 @ 10:46) (95% - 100%)  Wt(kg): --  I&O's Summary    19 Feb 2020 07:01  -  20 Feb 2020 07:00  --------------------------------------------------------  IN: 1490 mL / OUT: 0 mL / NET: 1490 mL        Appearance: Normal	  HEENT:   Normal oral mucosa, PERRL, EOMI	  Lymphatic: No lymphadenopathy  Cardiovascular: Normal S1 S2, No JVD, No murmurs, No edema  Respiratory:dec bs   Psychiatry: A & O x 3, Mood & affect appropriate  Gastrointestinal:  Soft, Non-tender, + BS	  Skin: No rashes, No ecchymoses, No cyanosis	  Neurologic: Non-focal  Extremities: Normal range of motion, No clubbing, cyanosis or edema  Vascular: Peripheral pulses palpable 2+ bilaterally    TELEMETRY: 	    ECG:  	  RADIOLOGY:  OTHER: 	  	  LABS:	 	    CARDIAC MARKERS:                                7.3    15.33 )-----------( 361      ( 20 Feb 2020 06:06 )             22.5           proBNP:   Lipid Profile:   HgA1c:   TSH:

## 2020-02-20 NOTE — PROGRESS NOTE ADULT - ASSESSMENT
46 yo female originally from [facility] admitted with SOB / syncope after recent surgery and found to have bilateral calf DVT and left sided pulmonary embolus    1. DVT /PE   - likely provoked by recent surgery  - feeling better, tolerating anticoagulation, continue Eliquis 10mg BID for now  - hypercoagulable workup sent, thus far all negative and APLS antibodies negative. If anticardiolipin antibodies positive will need to switch to coumadin  - mammogram 10/2019 - recommending additional images of left breast- needs outpatient follow up  - fever may be related to PE, ID following, currently off abx    2. normocytic anemia  - iron studies show mixed picture. High ferritin likely due to acute illness. recent heavy GYN bleeding and surgery. Follow CBC. Ordered for IV iron  - FU remainder of anemia workup   - monitor CBC    3. abnormal mammogram  - as above - needs further imaging

## 2020-02-21 LAB
DNA PLOIDY SPEC FC-IMP: SIGNIFICANT CHANGE UP
HCT VFR BLD CALC: 24 % — LOW (ref 34.5–45)
HGB BLD-MCNC: 7.5 G/DL — LOW (ref 11.5–15.5)
MCHC RBC-ENTMCNC: 27.1 PG — SIGNIFICANT CHANGE UP (ref 27–34)
MCHC RBC-ENTMCNC: 31.3 GM/DL — LOW (ref 32–36)
MCV RBC AUTO: 86.6 FL — SIGNIFICANT CHANGE UP (ref 80–100)
NRBC # BLD: 0 /100 WBCS — SIGNIFICANT CHANGE UP (ref 0–0)
PLATELET # BLD AUTO: 393 K/UL — SIGNIFICANT CHANGE UP (ref 150–400)
PTR INTERPRETATION: SIGNIFICANT CHANGE UP
RBC # BLD: 2.77 M/UL — LOW (ref 3.8–5.2)
RBC # FLD: 16.1 % — HIGH (ref 10.3–14.5)
SURGICAL PATHOLOGY STUDY: SIGNIFICANT CHANGE UP
WBC # BLD: 18.23 K/UL — HIGH (ref 3.8–10.5)
WBC # FLD AUTO: 18.23 K/UL — HIGH (ref 3.8–10.5)

## 2020-02-21 PROCEDURE — 99232 SBSQ HOSP IP/OBS MODERATE 35: CPT

## 2020-02-21 RX ORDER — ACETAMINOPHEN 500 MG
1000 TABLET ORAL ONCE
Refills: 0 | Status: COMPLETED | OUTPATIENT
Start: 2020-02-21 | End: 2020-02-21

## 2020-02-21 RX ORDER — CEFTRIAXONE 500 MG/1
1000 INJECTION, POWDER, FOR SOLUTION INTRAMUSCULAR; INTRAVENOUS EVERY 24 HOURS
Refills: 0 | Status: DISCONTINUED | OUTPATIENT
Start: 2020-02-22 | End: 2020-02-22

## 2020-02-21 RX ORDER — ACETAMINOPHEN 500 MG
650 TABLET ORAL ONCE
Refills: 0 | Status: COMPLETED | OUTPATIENT
Start: 2020-02-21 | End: 2020-02-21

## 2020-02-21 RX ORDER — CEFTRIAXONE 500 MG/1
INJECTION, POWDER, FOR SOLUTION INTRAMUSCULAR; INTRAVENOUS
Refills: 0 | Status: DISCONTINUED | OUTPATIENT
Start: 2020-02-21 | End: 2020-02-22

## 2020-02-21 RX ORDER — CEFTRIAXONE 500 MG/1
1000 INJECTION, POWDER, FOR SOLUTION INTRAMUSCULAR; INTRAVENOUS ONCE
Refills: 0 | Status: COMPLETED | OUTPATIENT
Start: 2020-02-21 | End: 2020-02-21

## 2020-02-21 RX ADMIN — Medication 400 MILLIGRAM(S): at 21:56

## 2020-02-21 RX ADMIN — Medication 325 MILLIGRAM(S): at 12:43

## 2020-02-21 RX ADMIN — Medication 650 MILLIGRAM(S): at 13:03

## 2020-02-21 RX ADMIN — APIXABAN 10 MILLIGRAM(S): 2.5 TABLET, FILM COATED ORAL at 05:55

## 2020-02-21 RX ADMIN — Medication 650 MILLIGRAM(S): at 17:23

## 2020-02-21 RX ADMIN — ATENOLOL 100 MILLIGRAM(S): 25 TABLET ORAL at 05:55

## 2020-02-21 RX ADMIN — Medication 650 MILLIGRAM(S): at 15:15

## 2020-02-21 RX ADMIN — Medication 1000 MILLIGRAM(S): at 23:22

## 2020-02-21 RX ADMIN — APIXABAN 10 MILLIGRAM(S): 2.5 TABLET, FILM COATED ORAL at 17:23

## 2020-02-21 RX ADMIN — Medication 650 MILLIGRAM(S): at 05:55

## 2020-02-21 RX ADMIN — CEFTRIAXONE 100 MILLIGRAM(S): 500 INJECTION, POWDER, FOR SOLUTION INTRAMUSCULAR; INTRAVENOUS at 08:34

## 2020-02-21 RX ADMIN — Medication 650 MILLIGRAM(S): at 06:50

## 2020-02-21 RX ADMIN — SODIUM CHLORIDE 75 MILLILITER(S): 9 INJECTION INTRAMUSCULAR; INTRAVENOUS; SUBCUTANEOUS at 05:56

## 2020-02-21 NOTE — PROGRESS NOTE ADULT - ASSESSMENT
44 y/o F with PMH of HTN, recent myomectomy (2/11/20 at [place]) requiring PRBC transfusion introp, discharged from [facility] on 2/13 and presents to ER 2/17 with syncopal episode. Found to have PE. Patient states she was having shortness of breath shortly after discharge and cough with thick white phlegm. CT chest with bibasilar atelectasis. Hospital course c/b uptrending leukocytosis/fevers concerning for infectious process-started on abx for PNA

## 2020-02-21 NOTE — PROGRESS NOTE ADULT - ASSESSMENT
pt w/ pulm embolus after sx  dvt /lower ext  resp insuff  improved   c/w a/c  pulm eval noted  lower ext dvt      n/c o2  echo noted  c/w atenolol   cards eval noted  heme eval noted  anemia  refuses prbcs  iv iron/heme f/u  fever  id f/u eval noted  c/w ceftriaxone for now   f/u cultures

## 2020-02-21 NOTE — PROGRESS NOTE ADULT - SUBJECTIVE AND OBJECTIVE BOX
CARDIOLOGY FOLLOW UP - Dr. Lakhani    CC no cp or sob       PHYSICAL EXAM:  T(C): 37.8 (02-21-20 @ 08:26), Max: 39.2 (02-20-20 @ 21:12)  HR: 89 (02-21-20 @ 08:26) (89 - 108)  BP: 147/83 (02-21-20 @ 08:26) (137/82 - 152/88)  RR: 20 (02-21-20 @ 08:26) (18 - 20)  SpO2: 98% (02-21-20 @ 08:26) (93% - 99%)  Wt(kg): --  I&O's Summary    20 Feb 2020 07:01  -  21 Feb 2020 07:00  --------------------------------------------------------  IN: 3865 mL / OUT: 0 mL / NET: 3865 mL        Appearance: Normal	  Cardiovascular: Normal S1 S2,RRR, No JVD, No murmurs  Respiratory: Lungs clear to auscultation	  Gastrointestinal:  Soft, Non-tender, + BS	  Extremities: Normal range of motion, No clubbing, cyanosis or edema        MEDICATIONS  (STANDING):  apixaban 10 milliGRAM(s) Oral every 12 hours  ATENolol  Tablet 100 milliGRAM(s) Oral daily  cefTRIAXone   IVPB      ferrous    sulfate 325 milliGRAM(s) Oral daily  senna 2 Tablet(s) Oral at bedtime  sodium chloride 0.9%. 1000 milliLiter(s) (75 mL/Hr) IV Continuous <Continuous>      TELEMETRY: nsr  	    ECG:  	  RADIOLOGY:   DIAGNOSTIC TESTING:  [ ] Echocardiogram:  [ ]  Catheterization:  [ ] Stress Test:    OTHER: 	    LABS:	 	    Troponin T, High Sensitivity Result: 20 ng/L [0 - 51] (02-18 @ 11:45)  Creatine Kinase, Serum: 54 U/L [25 - 170] (02-18 @ 11:45)  CKMB Units: 1.0 ng/mL [0.0 - 3.8] (02-18 @ 11:45)  Troponin T, High Sensitivity Result: 32 ng/L [0 - 51] (02-17 @ 12:01)                          7.5    18.23 )-----------( 393      ( 21 Feb 2020 06:10 )             24.0

## 2020-02-21 NOTE — PROGRESS NOTE ADULT - ASSESSMENT
44 yo female originally from [facility] admitted with SOB / syncope after recent surgery and found to have bilateral calf DVT and left sided pulmonary embolus    1. DVT /PE   - likely provoked by recent surgery  - feeling better, tolerating anticoagulation, continue Eliquis 10mg BID for now  - hypercoagulable workup sent, thus far all negative including genetic tests- Factor V leiden and prothrombin; DRVVT and beta 2 glycoprotein antibodies negative. If anticardiolipin antibodies positive will need to switch to coumadin  - mammogram 10/2019 - recommending additional images of left breast- needs outpatient follow up- pt is aware  - fever may be related to PE, ID following, on empiric ceftriaxone    2. normocytic anemia  - iron studies show mixed picture. High ferritin likely due to acute illness. recent heavy GYN bleeding and surgery  - s/p Venofer x 1, on oral iron  - B12/folate normal, no evidence of hemolysis  - Hg 7.5- continue to monitor and would keep Hg >7; pt refused blood transfusion prior- no Taoism reason, states would accept in emergency setting    3. abnormal mammogram  - as above - needs further imaging as outpatient- pt aware

## 2020-02-21 NOTE — PROGRESS NOTE ADULT - SUBJECTIVE AND OBJECTIVE BOX
infectious diseases progress note:    Patient is a 45y old  Female who presents with a chief complaint of feverf (2020 12:35)        Acute respiratory failure with hypoxia        ROS:  CONSTITUTIONAL:  Negative fever or chills, feels well, good appetite  EYES:  Negative  blurry vision or double vision  CARDIOVASCULAR:  Negative for chest pain or palpitations  RESPIRATORY:  Negative for cough, wheezing, or SOB   GASTROINTESTINAL:  Negative for nausea, vomiting, diarrhea, constipation, or abdominal pain  GENITOURINARY:  Negative frequency, urgency or dysuria  NEUROLOGIC:  No headache, confusion, dizziness, lightheadedness    Allergies    No Known Allergies    Intolerances        ANTIBIOTICS/RELEVANT:  antimicrobials    immunologic:    OTHER:  acetaminophen   Tablet .. 650 milliGRAM(s) Oral every 6 hours PRN  acetaminophen   Tablet .. 650 milliGRAM(s) Oral every 6 hours PRN  apixaban 10 milliGRAM(s) Oral every 12 hours  ATENolol  Tablet 100 milliGRAM(s) Oral daily  ferrous    sulfate 325 milliGRAM(s) Oral daily  oxycodone    5 mG/acetaminophen 325 mG 1 Tablet(s) Oral every 6 hours PRN  senna 2 Tablet(s) Oral at bedtime  sodium chloride 0.9%. 1000 milliLiter(s) IV Continuous <Continuous>      Objective:  Vital Signs Last 24 Hrs  T(C): 37.5 (2020 06:50), Max: 39.2 (2020 09:05)  T(F): 99.5 (2020 06:50), Max: 102.6 (2020 21:12)  HR: 97 (2020 06:50) (87 - 108)  BP: 149/85 (2020 05:22) (137/82 - 152/88)  BP(mean): --  RR: 18 (2020 05:22) (18 - 18)  SpO2: 98% (2020 05:22) (93% - 99%)    PHYSICAL EXAM:  Constitutional:Well-developed, well nourished--no acute distress  Eyes:TASHA, EOMI  Ear/Nose/Throat: no oral lesion, no sinus tenderness on percussion	  Neck:no JVD, no lymphadenopathy, supple  Respiratory: CTA karin  Cardiovascular: S1S2 RRR, no murmurs  Gastrointestinal:soft, (+) BS, no HSM  Extremities:no e/e/c        LABS:                        7.5    18.23 )-----------( 393      ( 2020 06:10 )             24.0             Urinalysis Basic - ( 2020 22:31 )    Color: Colorless / Appearance: Clear / S.007 / pH: x  Gluc: x / Ketone: Negative  / Bili: Negative / Urobili: Negative   Blood: x / Protein: Negative / Nitrite: Negative   Leuk Esterase: Negative / RBC: 3 /hpf / WBC 1 /HPF   Sq Epi: x / Non Sq Epi: 1 /hpf / Bacteria: Negative          MICROBIOLOGY:    RECENT CULTURES:   @ 19:20 .Sputum Sputum       Rare polymorphonuclear leukocytes seen per low power field  No squamous epithelial cells  Few Gram Variable Rods seen per oil power field  Few Gram positive cocci in pairs seen per oil power field              @ 01:12 .Blood Blood-Peripheral                No growth to date.     @ 01:08 .Blood Blood-Peripheral                No growth to date.     @ 15:10 .Blood Blood-Venous                No growth to date.          RESPIRATORY CULTURES:              RADIOLOGY & ADDITIONAL STUDIES:        Pager [phone]  After 5 pm/weekends or if no response :[phone]

## 2020-02-21 NOTE — PROGRESS NOTE ADULT - SUBJECTIVE AND OBJECTIVE BOX
CHIEF COMPLAINT:Patient is a 45y old  Female who presents with a chief complaint of fever (21 Feb 2020 07:29)    	        PAST MEDICAL & SURGICAL HISTORY:  Hypertension  History of myomectomy          REVIEW OF SYSTEMS:  feels better overall  RESPIRATORY: dec sob /   CARDIOVASCULAR: No chest pain, palpitations, passing out, dizziness,  GASTROINTESTINAL: No abdominal or epigastric pain. No nausea, vomiting, or hematemesis; No diarrhea or constipation. No melena or hematochezia.  GENITOURINARY: No dysuria, frequency, hematuria, or incontinence  NEUROLOGICAL: No headaches, memory loss, loss of strength, numbness, or tremors    MUSCULOSKELETAL: No joint pain or swelling; No muscle, back, or extremity pain    Medications:  MEDICATIONS  (STANDING):  apixaban 10 milliGRAM(s) Oral every 12 hours  ATENolol  Tablet 100 milliGRAM(s) Oral daily  cefTRIAXone   IVPB      ferrous    sulfate 325 milliGRAM(s) Oral daily  senna 2 Tablet(s) Oral at bedtime  sodium chloride 0.9%. 1000 milliLiter(s) (75 mL/Hr) IV Continuous <Continuous>    MEDICATIONS  (PRN):  acetaminophen   Tablet .. 650 milliGRAM(s) Oral every 6 hours PRN Temp greater or equal to 38C (100.4F)  acetaminophen   Tablet .. 650 milliGRAM(s) Oral every 6 hours PRN Moderate Pain (4 - 6)  oxycodone    5 mG/acetaminophen 325 mG 1 Tablet(s) Oral every 6 hours PRN Moderate Pain (4 - 6)    	    PHYSICAL EXAM:  T(C): 37.8 (02-21-20 @ 08:26), Max: 39.2 (02-20-20 @ 21:12)  HR: 89 (02-21-20 @ 08:26) (89 - 108)  BP: 147/83 (02-21-20 @ 08:26) (137/82 - 152/88)  RR: 20 (02-21-20 @ 08:26) (18 - 20)  SpO2: 98% (02-21-20 @ 08:26) (93% - 99%)  Wt(kg): --  I&O's Summary    20 Feb 2020 07:01  -  21 Feb 2020 07:00  --------------------------------------------------------  IN: 3865 mL / OUT: 0 mL / NET: 3865 mL        Appearance: Normal	  HEENT:   Normal oral mucosa, PERRL, EOMI	  Lymphatic: No lymphadenopathy  Cardiovascular: Normal S1 S2, No JVD, No murmurs, No edema  Respiratory: dec bs   Psychiatry: A & O x 3,   Gastrointestinal:  Soft, Non-tender, + BS	  Skin: No rashes, No ecchymoses, No cyanosis	  Neurologic: Non-focal  Extremities: Normal range of motion, No clubbing, cyanosis or edema  Vascular: Peripheral pulses palpable 2+ bilaterally    TELEMETRY: 	    ECG:  	  RADIOLOGY:  OTHER: 	  	  LABS:	 	    CARDIAC MARKERS:                                7.5    18.23 )-----------( 393      ( 21 Feb 2020 06:10 )             24.0           proBNP:   Lipid Profile:   HgA1c:   TSH:

## 2020-02-21 NOTE — PROGRESS NOTE ADULT - ASSESSMENT
45 year old female with hx of HTN and fibroids s/p abdominal myomectomy  (2/11/20 at [place]) requiring PRBC transfusion introp, presenting with  syncopal episode. Found to have PE    1. Syncope   in the setting of possible pna/  + pe / hypovolemia- anemia    no acs,  HS trop negative; ecg with sinus tachycardia secondary to pna/ acute pe   no active cp or sob   CTA chest with + PE/ bl lower lobe consolidation   bedside ED echo with no pericardial effusion   no occult arrhythmias identified   Echo with nl LV/ RV fx   continue a/c    2. Acute PE / DVT   likely provoked from recent surgery  LE dopplers with acute bl DVT  Echo with nl LV/ RV fx   on Eliquis     3. r/o PNA   increase in WBC/ temp   repeat chest xray noted   work up/ abx  per pulm ? ID   rvp negative / repeat  bcx negative

## 2020-02-21 NOTE — PROGRESS NOTE ADULT - SUBJECTIVE AND OBJECTIVE BOX
Follow-up Pulm Progress Note    Looks well, no complaints of dyspnea   98% on RA  New cough with white/pale yellow phlegm that started yesterday afternoon   Uptrending leukocytosis  TMax 39.2    Medications:  MEDICATIONS  (STANDING):  apixaban 10 milliGRAM(s) Oral every 12 hours  ATENolol  Tablet 100 milliGRAM(s) Oral daily  cefTRIAXone   IVPB      ferrous    sulfate 325 milliGRAM(s) Oral daily  senna 2 Tablet(s) Oral at bedtime  sodium chloride 0.9%. 1000 milliLiter(s) (75 mL/Hr) IV Continuous <Continuous>    MEDICATIONS  (PRN):  acetaminophen   Tablet .. 650 milliGRAM(s) Oral every 6 hours PRN Temp greater or equal to 38C (100.4F)  acetaminophen   Tablet .. 650 milliGRAM(s) Oral every 6 hours PRN Moderate Pain (4 - 6)  oxycodone    5 mG/acetaminophen 325 mG 1 Tablet(s) Oral every 6 hours PRN Moderate Pain (4 - 6)          Vital Signs Last 24 Hrs  T(C): 37.8 (2020 08:26), Max: 39.2 (2020 21:12)  T(F): 100.1 (2020 08:26), Max: 102.6 (2020 21:12)  HR: 89 (2020 08:26) (89 - 108)  BP: 147/83 (2020 08:26) (137/82 - 152/88)  BP(mean): --  RR: 20 (2020 08:26) (18 - 20)  SpO2: 98% (2020 08:26) (93% - 99%) on RA           @ 07:01  -  - @ 07:00  --------------------------------------------------------  IN: 3865 mL / OUT: 0 mL / NET: 3865 mL          LABS:                        7.5    18.23 )-----------( 393      ( 2020 06:10 )             24.0                 CAPILLARY BLOOD GLUCOSE          Urinalysis Basic - ( 2020 22:31 )    Color: Colorless / Appearance: Clear / S.007 / pH: x  Gluc: x / Ketone: Negative  / Bili: Negative / Urobili: Negative   Blood: x / Protein: Negative / Nitrite: Negative   Leuk Esterase: Negative / RBC: 3 /hpf / WBC 1 /HPF   Sq Epi: x / Non Sq Epi: 1 /hpf / Bacteria: Negative      Procalcitonin, Serum: 0.17 ng/mL (20 @ 06:06)                  CULTURES: (if applicable)  Culture Results:   No growth to date. ( @ 01:12)  Culture Results:   No growth to date. ( @ 01:08)  Culture Results:   No growth to date. ( @ 15:10)  Culture Results:   No growth to date. ( @ 15:10)    Most recent blood culture --  @ 19:20   -- -- .Sputum Sputum  19:20  Most recent blood culture --  01:12   -- -- .Blood Blood-Peripheral  @ 01:12  Most recent blood culture --  @ 01:08   -- -- .Blood Blood-Peripheral  @ 01:08  Most recent blood culture --  @ 15:10   -- -- .Blood Blood-Venous  @ 15:10    Blood culture  @ 19:20  --    Rare polymorphonuclear leukocytes seen per low power field  No squamous epithelial cells  Few Gram Variable Rods seen per oil power field  Few Gram positive cocci in pairs seen per oil power field  --  --  --    Urine culture    -->      Physical Examination:  PULM: Crackles bibasilar   CVS: S1, S2 heard    RADIOLOGY REVIEWED  CXR: Grossly clear, trace bibasilar atelectasis     CTA chest: < from: CT Angio Chest w/ IV Cont (20 @ 15:20) >  FINDINGS:    PULMONARY ARTERIES: Acute pulmonary embolus in the left distal main pulmonary artery extending into the lobar pulmonary arteries. The main pulmonary artery is normal in caliber.    LUNGS AND AIRWAYS: Patent central airways.  Consolidations in the bilateral lower lobes. Groundglass opacity in the lingula, linear atelectasis    PLEURA: No pleural effusion.    MEDIASTINUM AND JAMEE: No lymphadenopathy.    VESSELS: The aorta is normal in course and caliber.    HEART: Heart size is normal. No pericardial effusion.    CHEST WALL AND LOWER NECK: Within normal limits.    VISUALIZED UPPER ABDOMEN: Within normal limits.    BONES: Within normal limits.    IMPRESSION:     Acute pulmonary embolus in the left distal main pulmonary artery extending into the lobar pulmonary arteries.    Bilateral lower lobe consolidations.    < end of copied text >

## 2020-02-21 NOTE — CHART NOTE - NSCHARTNOTEFT_GEN_A_CORE
CC: Fever    HPI: Asked by RN to evaluate patient for fever with oral temperature of . Patient seen and examined at the bedside. Denies rigors, diaphoresis, headaches, dizziness, syncope, visual changes, chest pain, cough, palpitations, SOB, abdominal pain, N/V/D.     Vital Signs Last 24 Hrs  T(C): 38.9 (21 Feb 2020 21:11), Max: 39.1 (21 Feb 2020 18:41)  T(F): 102.1 (21 Feb 2020 21:11), Max: 102.3 (21 Feb 2020 18:41)  HR: 102 (21 Feb 2020 21:11) (89 - 102)  BP: 155/96 (21 Feb 2020 21:11) (137/82 - 155/96)  RR: 18 (21 Feb 2020 21:11) (18 - 20)  SpO2: 100% (21 Feb 2020 21:11) (95% - 100%)    PHYSICAL EXAM:  General: NAD, A&Ox3  Respiratory: Lungs clear to auscultation bilaterally. No wheezes, rales, rhonchi. Normal respiratory effort.   Cardiovascular: S1, S2 present. Regular rate and rhythm. No murmurs, rubs, or gallops  Gastrointestinal: BS x4 normoactive. Soft, non-tender, non-distended.   Extremities: 2+ peripheral pulses. No edema, cyanosis.    LABS:                        7.5    18.23 )-----------( 393      ( 21 Feb 2020 06:10 )             24.0             A/P  HPI:  44yo Female w. pmhx of HTN and fibroids s/p abdominal myomectomy requiring transfusion of packed red blood cells intraoperatively, discharged 2/13/2020 presents to ER for episode of syncope today.    Sudden onset of SOB and lightheadedness, followed by syncope.    EMS arrived on scene patient was tachycardia / tachypneic and sating at 50% ,   .  Patient admits to continued vaginal bleeding since surgery, no bleeding or pain at surgical site.    Denies fever, chills, n/v, visual changes, chest pain, hx of PE/dvt, leg swelling. (17 Feb 2020 19:10)    Now, patient with neutropenic fever with an oral temperature of . Patient is currently asymptomatic.     # Fever  -Repeat vitals were stable  -Blood cultures sent /: No growth to date   -CXR on /: Clear lungs   -Continue antipyretics/cooling measures    -Continue current antimicrobials  -Continue IVF for hydration  -Discussed with RN  -Will continue to monitor  -Will endorse to AM team      Karmen Partida PA-C  # CC: Fever    HPI: Asked by RN to evaluate patient for fever with oral temperature of 102.1. Patient has been persistently febrile since admission.  Patient seen and examined at the bedside. Patient is currently asymptomatic. Denies rigors, diaphoresis, headaches, dizziness, syncope, visual changes, chest pain, cough, palpitations, SOB, abdominal pain, N/V/D.     Vital Signs Last 24 Hrs  T(C): 38.9 (21 Feb 2020 21:11), Max: 39.1 (21 Feb 2020 18:41)  T(F): 102.1 (21 Feb 2020 21:11), Max: 102.3 (21 Feb 2020 18:41)  HR: 102 (21 Feb 2020 21:11) (89 - 102)  BP: 155/96 (21 Feb 2020 21:11) (137/82 - 155/96)  RR: 18 (21 Feb 2020 21:11) (18 - 20)  SpO2: 100% (21 Feb 2020 21:11) (95% - 100%)    PHYSICAL EXAM:  General: NAD, A&Ox3  Respiratory: Lungs clear to auscultation bilaterally. No wheezes, rales, rhonchi. Normal respiratory effort.   Cardiovascular: S1, S2 present. Regular rate and rhythm. No murmurs, rubs, or gallops  Gastrointestinal: BS x4 normoactive. Soft, non-tender, non-distended.   Extremities: 2+ peripheral pulses. No edema, cyanosis.    A/P  46yo Female w. pmhx of HTN and fibroids s/p abdominal myomectomy requiring transfusion of packed red blood cells intraoperatively, discharged 2/13/2020 presents to ER for episode of syncope. Patient was found to have L main PE and bilateral calf DVT, with questionable PNA.   Now, patient with fever with an oral temperature of 102.1. Patient has been persistently febrile since admission. Patient is currently asymptomatic.     # Fever likely 2/2 PE  -Rest of vitals were stable  -Blood cultures (2/20): No growth to date   -Sputum cultures (2/20): Normal respiratory lisa  -CXR (2/20): Clear lungs   -Tylenol 1gm IV x 1 STAT given  -Continue antipyretics/cooling measures    -Continue current antimicrobials (IV ceftriaxone)  -Continue eliquis for PE/DVT treatment  -Discussed with RN  -Will continue to monitor  -Will endorse to AM team      Karmen Partida PA-C  #[id number]

## 2020-02-21 NOTE — PROVIDER CONTACT NOTE (OTHER) - BACKGROUND
Pt admitted for acute respiratory failure with hypoxia. PMHx of HTN, fibroids s/p abdominal myomectomy.

## 2020-02-21 NOTE — PROVIDER CONTACT NOTE (OTHER) - ACTION/TREATMENT ORDERED:
CHEYANNE Partida notified and made aware. IV Tylenol to be given, will recheck temp & continue to monitor. Maintain cooling packs on pt, cont ceftriaxone, encourage fluid intake.

## 2020-02-21 NOTE — PROGRESS NOTE ADULT - SUBJECTIVE AND OBJECTIVE BOX
Chief Complaint: fu    History of Present Illness: pt seated comfortably; reports ambulated earlier without difficulty; no further chest pain, no further hemoptysis, +cough, + fever today, no dyspnea, no n/v/abd pain, no dizziness, no palpitations, no bleeding, appetite good      MEDICATIONS  (STANDING):  apixaban 10 milliGRAM(s) Oral every 12 hours  ATENolol  Tablet 100 milliGRAM(s) Oral daily  cefTRIAXone   IVPB      ferrous    sulfate 325 milliGRAM(s) Oral daily  senna 2 Tablet(s) Oral at bedtime  sodium chloride 0.9%. 1000 milliLiter(s) (75 mL/Hr) IV Continuous <Continuous>    MEDICATIONS  (PRN):  acetaminophen   Tablet .. 650 milliGRAM(s) Oral every 6 hours PRN Temp greater or equal to 38C (100.4F)  acetaminophen   Tablet .. 650 milliGRAM(s) Oral every 6 hours PRN Moderate Pain (4 - 6)  oxycodone    5 mG/acetaminophen 325 mG 1 Tablet(s) Oral every 6 hours PRN Moderate Pain (4 - 6)      Allergies    No Known Allergies    Intolerances        Vital Signs Last 24 Hrs  T(C): 37.9 (21 Feb 2020 15:16), Max: 39.2 (20 Feb 2020 21:12)  T(F): 100.3 (21 Feb 2020 15:16), Max: 102.6 (20 Feb 2020 21:12)  HR: 94 (21 Feb 2020 12:58) (89 - 108)  BP: 154/92 (21 Feb 2020 12:58) (137/82 - 154/92)  BP(mean): --  RR: 20 (21 Feb 2020 12:58) (18 - 20)  SpO2: 95% (21 Feb 2020 12:58) (93% - 99%)    PHYSICAL EXAM  General: adult in NAD  HEENT: clear oropharynx, anicteric sclera, pink conjunctiva  Neck: supple  CV: normal S1/S2 with no murmur rubs or gallops  Lungs: clear to auscultation, no wheezes, no rales  Abdomen: soft non-tender non-distended, incision intact with steri-stips, positive bowel sounds  Ext: no clubbing cyanosis or edema  Skin: no rashes and no petechiae  Lymph Nodes: No LAD in neck  Neuro: alert and oriented X 3, no focal deficits    LABS:                          7.5    18.23 )-----------( 393      ( 21 Feb 2020 06:10 )             24.0         Mean Cell Volume : 86.6 fl  Mean Cell Hemoglobin : 27.1 pg  Mean Cell Hemoglobin Concentration : 31.3 gm/dL  Auto Neutrophil # : x  Auto Lymphocyte # : x  Auto Monocyte # : x  Auto Eosinophil # : x  Auto Basophil # : x  Auto Neutrophil % : x  Auto Lymphocyte % : x  Auto Monocyte % : x  Auto Eosinophil % : x  Auto Basophil % : x      Serial CBC's  02-21 @ 06:10  Hct-24.0 / Hgb-7.5 / Plat-393 / RBC-2.77 / WBC-18.23  Serial CBC's  02-20 @ 06:06  Hct-22.5 / Hgb-7.3 / Plat-361 / RBC-2.62 / WBC-15.33  Serial CBC's  02-19 @ 06:20  Hct-24.8 / Hgb-7.8 / Plat-339 / RBC-2.84 / WBC-11.08  Serial CBC's  02-18 @ 11:45  Hct-29.0 / Hgb-9.0 / Plat-293 / RBC-3.27 / WBC-11.22  Serial CBC's  02-18 @ 06:45  Hct-23.2 / Hgb-7.1 / Plat-295 / RBC-2.64 / WBC-12.12  Serial CBC's  02-17 @ 21:39  Hct-24.3 / Hgb-7.8 / Plat-288 / RBC-2.81 / WBC-13.09                  Ferritin, Serum: 192 ng/mL (02-20 @ 00:44)  Iron - Total Binding Capacity.: 246 ug/dL (02-20 @ 00:44)  Vitamin B12, Serum: 564 pg/mL (02-20 @ 00:44)  Folate, Serum: 16.3 ng/mL (02-20 @ 00:44)          02-20 @ 00:44    ldh1 --  haptoglobin 285  RENATA--  uric acid--      Radiology:

## 2020-02-21 NOTE — PROGRESS NOTE ADULT - ASSESSMENT
45 year old admitted after recent discharge for myomectomy with anemia  admitted for syncope and sob and found to have a PE. chest xray is clear but there are consolidations in lower lobes felt to be collapse     doubt she had pna  no signs        She looks much better than the other day and is non toxic and comfortable on RA  ambulating    I suspect fever is form  DVT and PE.    Hold ab unless there is a clinical change   her wbc is still high;  repeat chest xray  negative    will dd ceftriaxone but I am not convinced this is pna

## 2020-02-21 NOTE — PROGRESS NOTE ADULT - PROBLEM SELECTOR PLAN 2
L main PE extending to lobar arteries and bilateral calf DVT provoked from recent surgery  -c/w Eliquis  -Trend H/H  -No evidence of RV strain on TTE  -Cardiac enzymes negative  -No episodes of recorded hypotension  -Hypercoagulable w/u thus far negative. f/u anticardiolipin ab

## 2020-02-22 LAB
ANION GAP SERPL CALC-SCNC: 13 MMOL/L — SIGNIFICANT CHANGE UP (ref 5–17)
BUN SERPL-MCNC: 7 MG/DL — SIGNIFICANT CHANGE UP (ref 7–23)
CALCIUM SERPL-MCNC: 9.1 MG/DL — SIGNIFICANT CHANGE UP (ref 8.4–10.5)
CHLORIDE SERPL-SCNC: 104 MMOL/L — SIGNIFICANT CHANGE UP (ref 96–108)
CO2 SERPL-SCNC: 23 MMOL/L — SIGNIFICANT CHANGE UP (ref 22–31)
CREAT SERPL-MCNC: 0.89 MG/DL — SIGNIFICANT CHANGE UP (ref 0.5–1.3)
CULTURE RESULTS: SIGNIFICANT CHANGE UP
GLUCOSE SERPL-MCNC: 114 MG/DL — HIGH (ref 70–99)
HCT VFR BLD CALC: 23.5 % — LOW (ref 34.5–45)
HGB BLD-MCNC: 7.7 G/DL — LOW (ref 11.5–15.5)
MCHC RBC-ENTMCNC: 28.1 PG — SIGNIFICANT CHANGE UP (ref 27–34)
MCHC RBC-ENTMCNC: 32.8 GM/DL — SIGNIFICANT CHANGE UP (ref 32–36)
MCV RBC AUTO: 85.8 FL — SIGNIFICANT CHANGE UP (ref 80–100)
NRBC # BLD: 0 /100 WBCS — SIGNIFICANT CHANGE UP (ref 0–0)
PLATELET # BLD AUTO: 469 K/UL — HIGH (ref 150–400)
POTASSIUM SERPL-MCNC: 3.6 MMOL/L — SIGNIFICANT CHANGE UP (ref 3.5–5.3)
POTASSIUM SERPL-SCNC: 3.6 MMOL/L — SIGNIFICANT CHANGE UP (ref 3.5–5.3)
RBC # BLD: 2.74 M/UL — LOW (ref 3.8–5.2)
RBC # FLD: 16.3 % — HIGH (ref 10.3–14.5)
SODIUM SERPL-SCNC: 140 MMOL/L — SIGNIFICANT CHANGE UP (ref 135–145)
SPECIMEN SOURCE: SIGNIFICANT CHANGE UP
WBC # BLD: 18.22 K/UL — HIGH (ref 3.8–10.5)
WBC # FLD AUTO: 18.22 K/UL — HIGH (ref 3.8–10.5)

## 2020-02-22 RX ORDER — PIPERACILLIN AND TAZOBACTAM 4; .5 G/20ML; G/20ML
3.38 INJECTION, POWDER, LYOPHILIZED, FOR SOLUTION INTRAVENOUS EVERY 8 HOURS
Refills: 0 | Status: DISCONTINUED | OUTPATIENT
Start: 2020-02-22 | End: 2020-02-24

## 2020-02-22 RX ORDER — PIPERACILLIN AND TAZOBACTAM 4; .5 G/20ML; G/20ML
3.38 INJECTION, POWDER, LYOPHILIZED, FOR SOLUTION INTRAVENOUS ONCE
Refills: 0 | Status: COMPLETED | OUTPATIENT
Start: 2020-02-22 | End: 2020-02-22

## 2020-02-22 RX ORDER — OXYCODONE HYDROCHLORIDE 5 MG/1
5 TABLET ORAL ONCE
Refills: 0 | Status: DISCONTINUED | OUTPATIENT
Start: 2020-02-22 | End: 2020-02-22

## 2020-02-22 RX ADMIN — CEFTRIAXONE 100 MILLIGRAM(S): 500 INJECTION, POWDER, FOR SOLUTION INTRAMUSCULAR; INTRAVENOUS at 08:36

## 2020-02-22 RX ADMIN — OXYCODONE HYDROCHLORIDE 5 MILLIGRAM(S): 5 TABLET ORAL at 06:20

## 2020-02-22 RX ADMIN — SODIUM CHLORIDE 75 MILLILITER(S): 9 INJECTION INTRAMUSCULAR; INTRAVENOUS; SUBCUTANEOUS at 11:21

## 2020-02-22 RX ADMIN — Medication 650 MILLIGRAM(S): at 17:03

## 2020-02-22 RX ADMIN — OXYCODONE HYDROCHLORIDE 5 MILLIGRAM(S): 5 TABLET ORAL at 05:25

## 2020-02-22 RX ADMIN — APIXABAN 10 MILLIGRAM(S): 2.5 TABLET, FILM COATED ORAL at 05:25

## 2020-02-22 RX ADMIN — APIXABAN 10 MILLIGRAM(S): 2.5 TABLET, FILM COATED ORAL at 17:02

## 2020-02-22 RX ADMIN — SENNA PLUS 2 TABLET(S): 8.6 TABLET ORAL at 21:35

## 2020-02-22 RX ADMIN — OXYCODONE AND ACETAMINOPHEN 1 TABLET(S): 5; 325 TABLET ORAL at 03:33

## 2020-02-22 RX ADMIN — Medication 325 MILLIGRAM(S): at 11:21

## 2020-02-22 RX ADMIN — Medication 650 MILLIGRAM(S): at 09:41

## 2020-02-22 RX ADMIN — OXYCODONE AND ACETAMINOPHEN 1 TABLET(S): 5; 325 TABLET ORAL at 04:30

## 2020-02-22 RX ADMIN — Medication 650 MILLIGRAM(S): at 11:12

## 2020-02-22 RX ADMIN — PIPERACILLIN AND TAZOBACTAM 200 GRAM(S): 4; .5 INJECTION, POWDER, LYOPHILIZED, FOR SOLUTION INTRAVENOUS at 17:03

## 2020-02-22 RX ADMIN — ATENOLOL 100 MILLIGRAM(S): 25 TABLET ORAL at 05:25

## 2020-02-22 RX ADMIN — OXYCODONE AND ACETAMINOPHEN 1 TABLET(S): 5; 325 TABLET ORAL at 21:34

## 2020-02-22 RX ADMIN — OXYCODONE AND ACETAMINOPHEN 1 TABLET(S): 5; 325 TABLET ORAL at 22:34

## 2020-02-22 NOTE — PROGRESS NOTE ADULT - SUBJECTIVE AND OBJECTIVE BOX
CC: no cp/sob, febrile overngight    TELEMETRY:     PHYSICAL EXAM:    T(C): 37.6 (02-22-20 @ 05:20), Max: 39.1 (02-21-20 @ 18:41)  HR: 99 (02-22-20 @ 05:20) (89 - 102)  BP: 142/85 (02-22-20 @ 05:20) (142/85 - 155/96)  RR: 18 (02-22-20 @ 05:20) (18 - 20)  SpO2: 96% (02-22-20 @ 05:20) (95% - 100%)  Wt(kg): --  I&O's Summary    21 Feb 2020 07:01  -  22 Feb 2020 07:00  --------------------------------------------------------  IN: 1685 mL / OUT: 0 mL / NET: 1685 mL        Appearance: Normal	  Cardiovascular: Normal S1 S2,RRR, No JVD, No murmurs  Respiratory: Lungs clear to auscultation	  Gastrointestinal:  Soft, Non-tender, + BS	  Extremities: Normal range of motion, No clubbing, cyanosis or edema  Vascular: Peripheral pulses palpable 2+ bilaterally     LABS:	 	                          7.7    18.22 )-----------( 469      ( 22 Feb 2020 06:47 )             23.5     02-22    140  |  104  |  7   ----------------------------<  114<H>  3.6   |  23  |  0.89    Ca    9.1      22 Feb 2020 06:47            CARDIAC MARKERS:

## 2020-02-22 NOTE — PROGRESS NOTE ADULT - ASSESSMENT
pt w/ pulm embolus after sx  dvt /lower ext  resp insuff  improved   c/w a/c  pulm eval noted  lower ext dvt      n/c o2  echo noted  c/w atenolol   cards eval noted  heme eval noted  anemia  refuses prbcs  iv iron/heme f/u  fever?etiology   id f/u eval noted  c/w abs as per id  f/u cultures neg thus far

## 2020-02-22 NOTE — PROGRESS NOTE ADULT - SUBJECTIVE AND OBJECTIVE BOX
Follow-up Pulm Progress Note    Tmax 102.3 yesterday evening   Sats 97% RA  Reports scant blood tinged sputum this AM    Medications:  MEDICATIONS  (STANDING):  apixaban 10 milliGRAM(s) Oral every 12 hours  ATENolol  Tablet 100 milliGRAM(s) Oral daily  cefTRIAXone   IVPB      cefTRIAXone   IVPB 1000 milliGRAM(s) IV Intermittent every 24 hours  ferrous    sulfate 325 milliGRAM(s) Oral daily  senna 2 Tablet(s) Oral at bedtime  sodium chloride 0.9%. 1000 milliLiter(s) (75 mL/Hr) IV Continuous <Continuous>    MEDICATIONS  (PRN):  acetaminophen   Tablet .. 650 milliGRAM(s) Oral every 6 hours PRN Temp greater or equal to 38C (100.4F)  acetaminophen   Tablet .. 650 milliGRAM(s) Oral every 6 hours PRN Moderate Pain (4 - 6)  bisacodyl 5 milliGRAM(s) Oral every 12 hours PRN Constipation  oxycodone    5 mG/acetaminophen 325 mG 1 Tablet(s) Oral every 6 hours PRN Moderate Pain (4 - 6)          Vital Signs Last 24 Hrs  T(C): 38 (22 Feb 2020 09:17), Max: 39.1 (21 Feb 2020 18:41)  T(F): 100.4 (22 Feb 2020 09:17), Max: 102.3 (21 Feb 2020 18:41)  HR: 95 (22 Feb 2020 09:17) (94 - 102)  BP: 147/91 (22 Feb 2020 09:17) (142/85 - 155/96)  BP(mean): --  RR: 18 (22 Feb 2020 09:17) (18 - 20)  SpO2: 95% (22 Feb 2020 09:17) (95% - 100%)          02-21 @ 07:01  -  02-22 @ 07:00  --------------------------------------------------------  IN: 1685 mL / OUT: 0 mL / NET: 1685 mL          LABS:                        7.7    18.22 )-----------( 469      ( 22 Feb 2020 06:47 )             23.5     02-22    140  |  104  |  7   ----------------------------<  114<H>  3.6   |  23  |  0.89    Ca    9.1      22 Feb 2020 06:47            Procalcitonin, Serum: 0.17 ng/mL (02-20-20 @ 06:06)          CULTURES:    Culture - Blood (collected 02-20-20 @ 01:12)  Source: .Blood Blood-Peripheral  Preliminary Report (02-21-20 @ 02:01):    No growth to date.    Culture - Blood (collected 02-20-20 @ 01:08)  Source: .Blood Blood-Peripheral  Preliminary Report (02-21-20 @ 02:01):    No growth to date.    Culture - Blood (collected 02-17-20 @ 15:10)  Source: .Blood Blood-Peripheral  Preliminary Report (02-18-20 @ 16:01):    No growth to date.    Culture - Blood (collected 02-17-20 @ 15:10)  Source: .Blood Blood-Venous  Preliminary Report (02-18-20 @ 16:01):    No growth to date.        Physical Examination:  PULM: Clear to auscultation bilaterally  CVS: RRR    RADIOLOGY REVIEWED  CXR: Grossly clear, trace bibasilar atelectasis     CTA chest: < from: CT Angio Chest w/ IV Cont (02.17.20 @ 15:20) >  FINDINGS:    PULMONARY ARTERIES: Acute pulmonary embolus in the left distal main pulmonary artery extending into the lobar pulmonary arteries. The main pulmonary artery is normal in caliber.    LUNGS AND AIRWAYS: Patent central airways.  Consolidations in the bilateral lower lobes. Groundglass opacity in the lingula, linear atelectasis    PLEURA: No pleural effusion.    MEDIASTINUM AND JAMEE: No lymphadenopathy.    VESSELS: The aorta is normal in course and caliber.    HEART: Heart size is normal. No pericardial effusion.    CHEST WALL AND LOWER NECK: Within normal limits.    VISUALIZED UPPER ABDOMEN: Within normal limits.    BONES: Within normal limits.    IMPRESSION:     Acute pulmonary embolus in the left distal main pulmonary artery extending into the lobar pulmonary arteries.    Bilateral lower lobe consolidations.    < end of copied text >

## 2020-02-22 NOTE — PROGRESS NOTE ADULT - SUBJECTIVE AND OBJECTIVE BOX
Chief Complaint: fu    History of Present Illness: with continued fevers, no rigors, + cough with clear sputum- a few bloody streaks; episode of chest pain similar to previous- now resolved; no dyspnea, no WAYNE, no dizziness, no n/v/abd pain, +BM, no other bleeding      MEDICATIONS  (STANDING):  apixaban 10 milliGRAM(s) Oral every 12 hours  ATENolol  Tablet 100 milliGRAM(s) Oral daily  ferrous    sulfate 325 milliGRAM(s) Oral daily  piperacillin/tazobactam IVPB. 3.375 Gram(s) IV Intermittent once  piperacillin/tazobactam IVPB.. 3.375 Gram(s) IV Intermittent every 8 hours  senna 2 Tablet(s) Oral at bedtime  sodium chloride 0.9%. 1000 milliLiter(s) (75 mL/Hr) IV Continuous <Continuous>    MEDICATIONS  (PRN):  acetaminophen   Tablet .. 650 milliGRAM(s) Oral every 6 hours PRN Temp greater or equal to 38C (100.4F)  acetaminophen   Tablet .. 650 milliGRAM(s) Oral every 6 hours PRN Moderate Pain (4 - 6)  bisacodyl 5 milliGRAM(s) Oral every 12 hours PRN Constipation  oxycodone    5 mG/acetaminophen 325 mG 1 Tablet(s) Oral every 6 hours PRN Moderate Pain (4 - 6)      Allergies    No Known Allergies    Intolerances        Vital Signs Last 24 Hrs  T(C): 37.6 (22 Feb 2020 11:16), Max: 39.1 (21 Feb 2020 18:41)  T(F): 99.7 (22 Feb 2020 11:16), Max: 102.3 (21 Feb 2020 18:41)  HR: 93 (22 Feb 2020 11:16) (93 - 102)  BP: 147/91 (22 Feb 2020 09:17) (142/85 - 155/96)  BP(mean): --  RR: 18 (22 Feb 2020 09:17) (18 - 18)  SpO2: 95% (22 Feb 2020 09:17) (95% - 100%)    PHYSICAL EXAM  General: adult in NAD  HEENT: clear oropharynx, anicteric sclera, pink conjunctiva  Neck: supple  CV: normal S1/S2   Lungs: clear to auscultation, no wheezes, no rales  Abdomen: soft non-tender non-distended, positive bowel sounds  Ext: no clubbing cyanosis or edema  Skin: no rashes and no petechiae  Lymph Nodes: No LAD in neck  Neuro: alert and oriented X 3, no focal deficits    LABS:                          7.7    18.22 )-----------( 469      ( 22 Feb 2020 06:47 )             23.5         Mean Cell Volume : 85.8 fl  Mean Cell Hemoglobin : 28.1 pg  Mean Cell Hemoglobin Concentration : 32.8 gm/dL  Auto Neutrophil # : x  Auto Lymphocyte # : x  Auto Monocyte # : x  Auto Eosinophil # : x  Auto Basophil # : x  Auto Neutrophil % : x  Auto Lymphocyte % : x  Auto Monocyte % : x  Auto Eosinophil % : x  Auto Basophil % : x      Serial CBC's  02-22 @ 06:47  Hct-23.5 / Hgb-7.7 / Plat-469 / RBC-2.74 / WBC-18.22  Serial CBC's  02-21 @ 06:10  Hct-24.0 / Hgb-7.5 / Plat-393 / RBC-2.77 / WBC-18.23  Serial CBC's  02-20 @ 06:06  Hct-22.5 / Hgb-7.3 / Plat-361 / RBC-2.62 / WBC-15.33  Serial CBC's  02-19 @ 06:20  Hct-24.8 / Hgb-7.8 / Plat-339 / RBC-2.84 / WBC-11.08      02-22    140  |  104  |  7   ----------------------------<  114<H>  3.6   |  23  |  0.89    Ca    9.1      22 Feb 2020 06:47            Ferritin, Serum: 192 ng/mL (02-20 @ 00:44)  Iron - Total Binding Capacity.: 246 ug/dL (02-20 @ 00:44)  Vitamin B12, Serum: 564 pg/mL (02-20 @ 00:44)  Folate, Serum: 16.3 ng/mL (02-20 @ 00:44)          02-20 @ 00:44    ldh1 --  haptoglobin 285  RENATA--  uric acid--      Radiology:

## 2020-02-22 NOTE — PROVIDER CONTACT NOTE (OTHER) - ACTION/TREATMENT ORDERED:
NP Mohini notified and made aware. PRN PO Tylenol to be given, will recheck temp & continue to monitor. Maintain cooling packs on pt, cont ceftriaxone, encourage fluid intake.

## 2020-02-22 NOTE — PROGRESS NOTE ADULT - ASSESSMENT
46 y/o F with PMH of HTN, recent myomectomy (2/11/20 at [place]) requiring PRBC transfusion introp, discharged from [facility] on 2/13 and presents to ER 2/17 with syncopal episode. Found to have PE. Patient states she was having shortness of breath shortly after discharge and cough with thick white phlegm. CT chest with bibasilar atelectasis. Hospital course c/b uptrending leukocytosis/fevers concerning for infectious process-started on abx for PNA

## 2020-02-22 NOTE — PROVIDER CONTACT NOTE (OTHER) - ACTION/TREATMENT ORDERED:
NP Mohini notified and made aware. PRN PO Tylenol to be given, will recheck temp & continue to monitor. Maintain cooling packs on pt, cont zoysn, encourage fluid intake.

## 2020-02-22 NOTE — PROGRESS NOTE ADULT - PROBLEM SELECTOR PLAN 1
TMax 39.1 yesterday evening, non-toxic appearing  -CTA with bibasilar opacities  -Given high temps and rising leukocytosis would treat with abx for PNA  -c/w Ceftriaxone  -Sputum culture with normal respiratory lisa  -UA negative, Blood cultures, RVP negative TMax 39.1 yesterday evening, non-toxic appearing  -CTA with bibasilar opacities  -Given high temps and rising leukocytosis would treat with abx for PNA  -Still spiking temps, d/c Ceftriaxone start zosyn. ID f/u.  -Sputum culture with normal respiratory lisa  -UA negative, Blood cultures, RVP negative

## 2020-02-22 NOTE — PROGRESS NOTE ADULT - ASSESSMENT
45 year old female with hx of HTN and fibroids s/p abdominal myomectomy  (2/11/20 at [place]) requiring PRBC transfusion introp, presenting with  syncopal episode. Found to have PE    1. Syncope   in the setting of possible pna/  + pe / hypovolemia- anemia    no acs,  HS trop negative; ecg with sinus tachycardia secondary to pna/ acute pe   no active cp or sob   CTA chest with + PE/ bl lower lobe consolidation   bedside ED echo with no pericardial effusion   no occult arrhythmias identified   Echo with nl LV/ RV fx   continue a/c    2. Acute PE / DVT   likely provoked from recent surgery  LE dopplers with acute bl DVT  Echo with nl LV/ RV fx   on Eliquis     3. r/o PNA   increase in WBC/ temp   repeat chest xray noted   work up/ abx  per pulm ? ID   rvp negative / repeat  bcx negative

## 2020-02-22 NOTE — PROGRESS NOTE ADULT - ASSESSMENT
44 yo female originally from [facility] admitted with SOB / syncope after recent surgery and found to have bilateral calf DVT and left sided pulmonary embolus    1. DVT /PE   - likely provoked by recent surgery  - tolerating anticoagulation, continue Eliquis 10mg BID for now  - hypercoagulable workup sent, thus far all negative including genetic tests- Factor V leiden and prothrombin; DRVVT and beta 2 glycoprotein antibodies negative; anticardiolipin Ab are pending- If anticardiolipin antibodies positive will need to switch to coumadin  - mammogram 10/2019 - recommending additional images of left breast- needs outpatient follow up- pt is aware  - fever may be related to PE, ID following, on empiric Abx- ceftriaxone changed to zosyn    2. normocytic anemia  - iron studies show mixed picture. High ferritin likely due to acute illness. recent heavy GYN bleeding and surgery  - s/p Venofer x 1, on oral iron  - B12/folate normal, no evidence of hemolysis  - Hg stable 7.7- continue to monitor and would keep Hg >7; pt refused blood transfusion prior- no Religion reason, states would accept in emergency setting    3. abnormal mammogram  - as above - needs further imaging as outpatient- pt aware

## 2020-02-22 NOTE — PROGRESS NOTE ADULT - PROBLEM SELECTOR PLAN 3
Bibasilar dependent atelectasis  ?aspiration event inta-op  -Incentive spirometer/OOB, ambulate as tolerated

## 2020-02-22 NOTE — PROVIDER CONTACT NOTE (OTHER) - ASSESSMENT
Pt A&Ox4, denies chest pain/SOB/chills. T: 100.4. Blood culturesx2 2/20 no growth to date. Ceftriaxone d/c now started on zoysn x 5 days.

## 2020-02-22 NOTE — PROGRESS NOTE ADULT - SUBJECTIVE AND OBJECTIVE BOX
CHIEF COMPLAINT:Patient is a 45y old  Female who presents with a chief complaint of fever (21 Feb 2020 07:29)    	        PAST MEDICAL & SURGICAL HISTORY:  Hypertension  History of myomectomy          REVIEW OF SYSTEMS:  feels better overall  fever last night again   NECK: No pain or stiffness  RESPIRATORY: No cough, wheezing, chills or hemoptysis; No Shortness of Breath  CARDIOVASCULAR: No chest pain, palpitations, passing out, dizziness, or leg swelling  GASTROINTESTINAL: No abdominal or epigastric pain. No nausea, vomiting, or hematemesis; No diarrhea or constipation. No melena or hematochezia.  GENITOURINARY: No dysuria, frequency, hematuria, or incontinence  NEUROLOGICAL: No headaches, memory loss, loss of strength, numbness, or tremors      Medications:  MEDICATIONS  (STANDING):  apixaban 10 milliGRAM(s) Oral every 12 hours  ATENolol  Tablet 100 milliGRAM(s) Oral daily  cefTRIAXone   IVPB      cefTRIAXone   IVPB 1000 milliGRAM(s) IV Intermittent every 24 hours  ferrous    sulfate 325 milliGRAM(s) Oral daily  senna 2 Tablet(s) Oral at bedtime  sodium chloride 0.9%. 1000 milliLiter(s) (75 mL/Hr) IV Continuous <Continuous>    MEDICATIONS  (PRN):  acetaminophen   Tablet .. 650 milliGRAM(s) Oral every 6 hours PRN Temp greater or equal to 38C (100.4F)  acetaminophen   Tablet .. 650 milliGRAM(s) Oral every 6 hours PRN Moderate Pain (4 - 6)  bisacodyl 5 milliGRAM(s) Oral every 12 hours PRN Constipation  oxycodone    5 mG/acetaminophen 325 mG 1 Tablet(s) Oral every 6 hours PRN Moderate Pain (4 - 6)    	    PHYSICAL EXAM:  T(C): 37.6 (02-22-20 @ 11:16), Max: 39.1 (02-21-20 @ 18:41)  HR: 93 (02-22-20 @ 11:16) (93 - 102)  BP: 147/91 (02-22-20 @ 09:17) (142/85 - 155/96)  RR: 18 (02-22-20 @ 09:17) (18 - 20)  SpO2: 95% (02-22-20 @ 09:17) (95% - 100%)  Wt(kg): --  I&O's Summary    21 Feb 2020 07:01  -  22 Feb 2020 07:00  --------------------------------------------------------  IN: 1685 mL / OUT: 0 mL / NET: 1685 mL        Appearance: Normal	  HEENT:   Normal oral mucosa, PERRL, EOMI	  Lymphatic: No lymphadenopathy  Cardiovascular: Normal S1 S2, No JVD, No murmurs, No edema  Respiratory: Lungs clear to auscultation	  Psychiatry: A & O x 3,   Gastrointestinal:  Soft, Non-tender, + BS	  Skin: No rashes, No ecchymoses, No cyanosis	  Neurologic: Non-focal  Extremities: Normal range of motion, No clubbing, cyanosis or edema  Vascular: Peripheral pulses palpable 2+ bilaterally    TELEMETRY: 	    ECG:  	  RADIOLOGY:  OTHER: 	  	  LABS:	 	    CARDIAC MARKERS:                                7.7    18.22 )-----------( 469      ( 22 Feb 2020 06:47 )             23.5     02-22    140  |  104  |  7   ----------------------------<  114<H>  3.6   |  23  |  0.89    Ca    9.1      22 Feb 2020 06:47      proBNP:   Lipid Profile:   HgA1c:   TSH:

## 2020-02-23 LAB
HCT VFR BLD CALC: 23.7 % — LOW (ref 34.5–45)
HGB BLD-MCNC: 7.6 G/DL — LOW (ref 11.5–15.5)
MCHC RBC-ENTMCNC: 27.6 PG — SIGNIFICANT CHANGE UP (ref 27–34)
MCHC RBC-ENTMCNC: 32.1 GM/DL — SIGNIFICANT CHANGE UP (ref 32–36)
MCV RBC AUTO: 86.2 FL — SIGNIFICANT CHANGE UP (ref 80–100)
NRBC # BLD: 0 /100 WBCS — SIGNIFICANT CHANGE UP (ref 0–0)
PLATELET # BLD AUTO: 497 K/UL — HIGH (ref 150–400)
RBC # BLD: 2.75 M/UL — LOW (ref 3.8–5.2)
RBC # FLD: 16.3 % — HIGH (ref 10.3–14.5)
WBC # BLD: 15.51 K/UL — HIGH (ref 3.8–10.5)
WBC # FLD AUTO: 15.51 K/UL — HIGH (ref 3.8–10.5)

## 2020-02-23 PROCEDURE — 99232 SBSQ HOSP IP/OBS MODERATE 35: CPT

## 2020-02-23 RX ADMIN — APIXABAN 10 MILLIGRAM(S): 2.5 TABLET, FILM COATED ORAL at 06:35

## 2020-02-23 RX ADMIN — PIPERACILLIN AND TAZOBACTAM 25 GRAM(S): 4; .5 INJECTION, POWDER, LYOPHILIZED, FOR SOLUTION INTRAVENOUS at 00:24

## 2020-02-23 RX ADMIN — PIPERACILLIN AND TAZOBACTAM 25 GRAM(S): 4; .5 INJECTION, POWDER, LYOPHILIZED, FOR SOLUTION INTRAVENOUS at 17:02

## 2020-02-23 RX ADMIN — Medication 325 MILLIGRAM(S): at 11:17

## 2020-02-23 RX ADMIN — SODIUM CHLORIDE 75 MILLILITER(S): 9 INJECTION INTRAMUSCULAR; INTRAVENOUS; SUBCUTANEOUS at 17:02

## 2020-02-23 RX ADMIN — APIXABAN 10 MILLIGRAM(S): 2.5 TABLET, FILM COATED ORAL at 17:02

## 2020-02-23 RX ADMIN — ATENOLOL 100 MILLIGRAM(S): 25 TABLET ORAL at 05:38

## 2020-02-23 RX ADMIN — PIPERACILLIN AND TAZOBACTAM 25 GRAM(S): 4; .5 INJECTION, POWDER, LYOPHILIZED, FOR SOLUTION INTRAVENOUS at 11:17

## 2020-02-23 NOTE — PROGRESS NOTE ADULT - SUBJECTIVE AND OBJECTIVE BOX
CC: no cp/sob    TELEMETRY:     PHYSICAL EXAM:    T(C): 37.3 (02-23-20 @ 05:18), Max: 38.1 (02-22-20 @ 21:01)  HR: 102 (02-23-20 @ 05:18) (72 - 102)  BP: 151/88 (02-23-20 @ 05:18) (133/85 - 151/91)  RR: 18 (02-23-20 @ 05:18) (18 - 18)  SpO2: 98% (02-23-20 @ 05:18) (95% - 100%)  Wt(kg): --  I&O's Summary    22 Feb 2020 07:01  -  23 Feb 2020 07:00  --------------------------------------------------------  IN: 1495 mL / OUT: 0 mL / NET: 1495 mL        Appearance: Normal	  Cardiovascular: Normal S1 S2,RRR, No JVD, No murmurs  Respiratory: Lungs clear to auscultation	  Gastrointestinal:  Soft, Non-tender, + BS	  Extremities: Normal range of motion, No clubbing, cyanosis or edema  Vascular: Peripheral pulses palpable 2+ bilaterally     LABS:	 	                          7.6    15.51 )-----------( 497      ( 23 Feb 2020 06:35 )             23.7     02-22    140  |  104  |  7   ----------------------------<  114<H>  3.6   |  23  |  0.89    Ca    9.1      22 Feb 2020 06:47            CARDIAC MARKERS:

## 2020-02-23 NOTE — PROVIDER CONTACT NOTE (OTHER) - ACTION/TREATMENT ORDERED:
----- Message from Suzan Angeles RN sent at 4/20/2018  3:49 PM EDT -----  Per Dr. Stanley, patients appt needs to be moved up to next week.  Discuss with her if you have trouble with her schedule.     [phone]   Give PRN oxycodone/acetominophen and icepacks.

## 2020-02-23 NOTE — PROGRESS NOTE ADULT - ASSESSMENT
46 yo female originally from [facility] admitted with SOB / syncope after recent surgery and found to have bilateral calf DVT and left sided pulmonary embolus    1. DVT /PE   - likely provoked by recent surgery  - tolerating anticoagulation, continue Eliquis 10mg BID for now  - hypercoagulable workup sent, thus far all negative including genetic tests- Factor V leiden and prothrombin; DRVVT and beta 2 glycoprotein antibodies negative; anticardiolipin Ab are pending- If anticardiolipin antibodies positive will need to switch to coumadin  - mammogram 10/2019 - recommending additional images of left breast- needs outpatient follow up- pt is aware  - fever may be related to PE, ID following, on empiric Abx- ceftriaxone changed to zosyn    2. normocytic anemia  - iron studies show mixed picture. High ferritin likely due to acute illness. recent heavy GYN bleeding and surgery  - s/p Venofer x 1, on oral iron  - B12/folate normal, no evidence of hemolysis; no further bleeding   - pt refused blood transfusion prior- no [employer] reason, states would accept in emergency setting  - Hg overall stable, 7.6, continue to monitor and would keep Hg >7;    3. abnormal mammogram  - as above - needs further imaging as outpatient- pt aware

## 2020-02-23 NOTE — PROGRESS NOTE ADULT - SUBJECTIVE AND OBJECTIVE BOX
INFECTIOUS DISEASES FOLLOW UP-- Luz Marina Christiansen  [phone]    This is a follow up note for this  45yFemale with  Acute respiratory failure with hypoxia  reports starting to feel better sitting in a chair      ROS:  CONSTITUTIONAL:  improving fevers, good appetite  CARDIOVASCULAR:  No chest pain or palpitations  RESPIRATORY:  No dyspnea or SOB  GASTROINTESTINAL:  No nausea, vomiting, diarrhea, or abdominal pain  GENITOURINARY:  No dysuria  NEUROLOGIC:  No headache,     Allergies    No Known Allergies    Intolerances        ANTIBIOTICS/RELEVANT:  antimicrobials  piperacillin/tazobactam IVPB.. 3.375 Gram(s) IV Intermittent every 8 hours    immunologic:    OTHER:  acetaminophen   Tablet .. 650 milliGRAM(s) Oral every 6 hours PRN  acetaminophen   Tablet .. 650 milliGRAM(s) Oral every 6 hours PRN  apixaban 10 milliGRAM(s) Oral every 12 hours  ATENolol  Tablet 100 milliGRAM(s) Oral daily  bisacodyl 5 milliGRAM(s) Oral every 12 hours PRN  ferrous    sulfate 325 milliGRAM(s) Oral daily  oxycodone    5 mG/acetaminophen 325 mG 1 Tablet(s) Oral every 6 hours PRN  senna 2 Tablet(s) Oral at bedtime  sodium chloride 0.9%. 1000 milliLiter(s) IV Continuous <Continuous>      Objective:  Vital Signs Last 24 Hrs  T(C): 37.4 (23 Feb 2020 09:00), Max: 38.1 (22 Feb 2020 21:01)  T(F): 99.3 (23 Feb 2020 09:00), Max: 100.6 (22 Feb 2020 21:01)  HR: 88 (23 Feb 2020 09:00) (72 - 102)  BP: 146/88 (23 Feb 2020 09:00) (133/85 - 151/91)  BP(mean): --  RR: 18 (23 Feb 2020 09:00) (18 - 18)  SpO2: 97% (23 Feb 2020 09:00) (96% - 100%)    PHYSICAL EXAM:  Constitutional:no acute distress  Eyes:TASHA, EOMI  Ear/Nose/Throat: no oral lesions, 	  Respiratory: clear BL occasional wheeze right side  Cardiovascular: S1S2  Gastrointestinal:soft, (+) BS, no tenderness  Extremities:no e/e/c  No Lymphadenopathy  IV sites not inflammed.    LABS:                        7.6    15.51 )-----------( 497      ( 23 Feb 2020 06:35 )             23.7     02-22    140  |  104  |  7   ----------------------------<  114<H>  3.6   |  23  |  0.89    Ca    9.1      22 Feb 2020 06:47            MICROBIOLOGY:            RECENT CULTURES:  02-20 @ 19:20  .Sputum Sputum  --  --  --    Normal Respiratory Florence present  --  02-20 @ 01:12  .Blood Blood-Peripheral  --  --  --    No growth to date.  --  02-20 @ 01:08  .Blood Blood-Peripheral  --  --  --    No growth to date.  --  02-17 @ 15:10  .Blood Blood-Venous  --  --  --    No growth at 5 days.  --      RADIOLOGY & ADDITIONAL STUDIES:    < from: Xray Chest 1 View- PORTABLE-Routine (02.20.20 @ 09:03) >    Impression:    Clear lungs. No change from prior    < end of copied text >  < from: CT Angio Chest w/ IV Cont (02.17.20 @ 15:20) >    IMPRESSION:     Acute pulmonary embolus in the left distal main pulmonary artery extending into the lobar pulmonary arteries.    Bilateral lower lobe consolidations.    < end of copied text >

## 2020-02-23 NOTE — PROGRESS NOTE ADULT - SUBJECTIVE AND OBJECTIVE BOX
CHIEF COMPLAINT:Patient is a 45y old  Female who presents with a chief complaint of fever (21 Feb 2020 07:29)    	        PAST MEDICAL & SURGICAL HISTORY:  Hypertension  History of myomectomy          REVIEW OF SYSTEMS:  fever   NECK: No pain or stiffness  RESPIRATORY: No cough, wheezing, chills or hemoptysis; No Shortness of Breath  CARDIOVASCULAR: No chest pain, palpitations, passing out, dizziness,   GASTROINTESTINAL: No abdominal or epigastric pain. No nausea, vomiting, or hematemesis;   GENITOURINARY: No dysuria, frequency, hematuria,  NEUROLOGICAL: No headaches,     Medications:  MEDICATIONS  (STANDING):  apixaban 10 milliGRAM(s) Oral every 12 hours  ATENolol  Tablet 100 milliGRAM(s) Oral daily  ferrous    sulfate 325 milliGRAM(s) Oral daily  piperacillin/tazobactam IVPB.. 3.375 Gram(s) IV Intermittent every 8 hours  senna 2 Tablet(s) Oral at bedtime  sodium chloride 0.9%. 1000 milliLiter(s) (75 mL/Hr) IV Continuous <Continuous>    MEDICATIONS  (PRN):  acetaminophen   Tablet .. 650 milliGRAM(s) Oral every 6 hours PRN Temp greater or equal to 38C (100.4F)  acetaminophen   Tablet .. 650 milliGRAM(s) Oral every 6 hours PRN Moderate Pain (4 - 6)  bisacodyl 5 milliGRAM(s) Oral every 12 hours PRN Constipation  oxycodone    5 mG/acetaminophen 325 mG 1 Tablet(s) Oral every 6 hours PRN Moderate Pain (4 - 6)    	    PHYSICAL EXAM:  T(C): 37.4 (02-23-20 @ 09:00), Max: 38.1 (02-22-20 @ 21:01)  HR: 88 (02-23-20 @ 09:00) (72 - 102)  BP: 146/88 (02-23-20 @ 09:00) (133/85 - 151/91)  RR: 18 (02-23-20 @ 09:00) (18 - 18)  SpO2: 97% (02-23-20 @ 09:00) (96% - 100%)  Wt(kg): --  I&O's Summary    22 Feb 2020 07:01  -  23 Feb 2020 07:00  --------------------------------------------------------  IN: 1495 mL / OUT: 0 mL / NET: 1495 mL    23 Feb 2020 07:01  -  23 Feb 2020 12:04  --------------------------------------------------------  IN: 425 mL / OUT: 0 mL / NET: 425 mL        Appearance: Normal	  HEENT:   Normal oral mucosa, PERRL, EOMI	  Lymphatic: No lymphadenopathy  Cardiovascular: Normal S1 S2, No JVD, No murmurs, No edema  Respiratory: Lungs clear to auscultation	  Psychiatry: A & O x 3  Gastrointestinal:  Soft, Non-tender, + BS	  Skin: No rashes, No ecchymoses, No cyanosis	  Neurologic: Non-focal  Extremities: Normal range of motion, No clubbing, cyanosis or edema  Vascular: Peripheral pulses palpable 2+ bilaterally    TELEMETRY: 	    ECG:  	  RADIOLOGY:  OTHER: 	  	  LABS:	 	    CARDIAC MARKERS:                                7.6    15.51 )-----------( 497      ( 23 Feb 2020 06:35 )             23.7     02-22    140  |  104  |  7   ----------------------------<  114<H>  3.6   |  23  |  0.89    Ca    9.1      22 Feb 2020 06:47      proBNP:   Lipid Profile:   HgA1c:   TSH:

## 2020-02-23 NOTE — PROGRESS NOTE ADULT - SUBJECTIVE AND OBJECTIVE BOX
Chief Complaint: fu    History of Present Illness: +fever last evening; no rigors; no increase in cough- occasional blood streaks; no dyspnea, no chest pain today- with back pain overnight; no n/v/abd pain, +BM- no bleeding, tolerating diet; ambulating without difficulty; no dizziness      MEDICATIONS  (STANDING):  apixaban 10 milliGRAM(s) Oral every 12 hours  ATENolol  Tablet 100 milliGRAM(s) Oral daily  ferrous    sulfate 325 milliGRAM(s) Oral daily  piperacillin/tazobactam IVPB.. 3.375 Gram(s) IV Intermittent every 8 hours  senna 2 Tablet(s) Oral at bedtime  sodium chloride 0.9%. 1000 milliLiter(s) (75 mL/Hr) IV Continuous <Continuous>    MEDICATIONS  (PRN):  acetaminophen   Tablet .. 650 milliGRAM(s) Oral every 6 hours PRN Temp greater or equal to 38C (100.4F)  acetaminophen   Tablet .. 650 milliGRAM(s) Oral every 6 hours PRN Moderate Pain (4 - 6)  bisacodyl 5 milliGRAM(s) Oral every 12 hours PRN Constipation  oxycodone    5 mG/acetaminophen 325 mG 1 Tablet(s) Oral every 6 hours PRN Moderate Pain (4 - 6)      Allergies    No Known Allergies    Intolerances        Vital Signs Last 24 Hrs  T(C): 37.4 (23 Feb 2020 09:00), Max: 38.1 (22 Feb 2020 21:01)  T(F): 99.3 (23 Feb 2020 09:00), Max: 100.6 (22 Feb 2020 21:01)  HR: 88 (23 Feb 2020 09:00) (72 - 102)  BP: 146/88 (23 Feb 2020 09:00) (133/85 - 151/91)  BP(mean): --  RR: 18 (23 Feb 2020 09:00) (18 - 18)  SpO2: 97% (23 Feb 2020 09:00) (96% - 100%)    PHYSICAL EXAM  General: adult in NAD  HEENT: clear oropharynx, anicteric sclera, pink conjunctiva  Neck: supple  CV: normal S1/S2   Lungs: clear to auscultation, no wheezes, no rales  Abdomen: soft non-tender non-distended, positive bowel sounds  Ext: no clubbing cyanosis or edema  Skin: no rashes and no petechiae  Lymph Nodes: No LAD in neck  Neuro: alert and oriented X 3, no focal deficits    LABS:                          7.6    15.51 )-----------( 497      ( 23 Feb 2020 06:35 )             23.7         Mean Cell Volume : 86.2 fl  Mean Cell Hemoglobin : 27.6 pg  Mean Cell Hemoglobin Concentration : 32.1 gm/dL  Auto Neutrophil # : x  Auto Lymphocyte # : x  Auto Monocyte # : x  Auto Eosinophil # : x  Auto Basophil # : x  Auto Neutrophil % : x  Auto Lymphocyte % : x  Auto Monocyte % : x  Auto Eosinophil % : x  Auto Basophil % : x      Serial CBC's  02-23 @ 06:35  Hct-23.7 / Hgb-7.6 / Plat-497 / RBC-2.75 / WBC-15.51  Serial CBC's  02-22 @ 06:47  Hct-23.5 / Hgb-7.7 / Plat-469 / RBC-2.74 / WBC-18.22  Serial CBC's  02-21 @ 06:10  Hct-24.0 / Hgb-7.5 / Plat-393 / RBC-2.77 / WBC-18.23  Serial CBC's  02-20 @ 06:06  Hct-22.5 / Hgb-7.3 / Plat-361 / RBC-2.62 / WBC-15.33      02-22    140  |  104  |  7   ----------------------------<  114<H>  3.6   |  23  |  0.89    Ca    9.1      22 Feb 2020 06:47            Ferritin, Serum: 192 ng/mL (02-20 @ 00:44)  Iron - Total Binding Capacity.: 246 ug/dL (02-20 @ 00:44)  Vitamin B12, Serum: 564 pg/mL (02-20 @ 00:44)  Folate, Serum: 16.3 ng/mL (02-20 @ 00:44)          02-20 @ 00:44    ldh1 --  haptoglobin 285  RENATA--  uric acid--      Radiology:

## 2020-02-23 NOTE — PROGRESS NOTE ADULT - ASSESSMENT
pt w/ pulm embolus after sx  dvt /lower ext  resp insuff  improved   c/w a/c  pulm eval noted  lower ext dvt      n/c o2  echo noted  c/w atenolol   cards eval noted  heme eval noted  anemia  refuses prbcs  iv iron/heme f/u  fever?etiology ?related to emboli   id f/u eval noted  c/w abs as per id  f/u cultures neg thus far   d/c when ok w/ id

## 2020-02-23 NOTE — PROGRESS NOTE ADULT - ASSESSMENT
45 year old admitted after recent discharge for myomectomy with anemia  admitted for syncope and sob and found to have a PE. chest xray is clear but there are consolidations in lower lobes felt to be collapse     doubt she had pna  no signs        She reports improvement in her overall well being  and is non toxic and comfortable on RA sitting in a chair    Receiving a short course of Zosyn for a possible superimposed bacterial infection but in all likelihood her fevers from the  DVTs and PE  Plan to complete 3-5 days of antibiotics  Consider checking hypercoagulable work up with lupus anticoagulant     Gautam Christiansen MD  [phone]  After 5pm/weekends [phone]

## 2020-02-23 NOTE — PROGRESS NOTE ADULT - ASSESSMENT
45 year old female with hx of HTN and fibroids s/p abdominal myomectomy  (2/11/20 at [place]) requiring PRBC transfusion introp, presenting with  syncopal episode. Found to have PE    1. Syncope   in the setting of acute PE/ hypovolemia- anemia    no acs,  HS trop negative; ecg with sinus tachycardia secondary to pna/ acute pe   no active cp or sob   CTA chest with + PE/ bl lower lobe consolidation   bedside ED echo with no pericardial effusion   no occult arrhythmias identified   Echo with nl LV/ RV fx   continue a/c    2. Acute PE / DVT   likely provoked from recent surgery  LE dopplers with acute bl DVT  Echo with nl LV/ RV fx   on Eliquis     3. PNA   increase in WBC/ temp   treat w abx per pulm  repeat chest xray noted   work up/ abx  per pulm ? ID   rvp negative / repeat  bcx negative

## 2020-02-24 ENCOUNTER — TRANSCRIPTION ENCOUNTER (OUTPATIENT)
Age: 45
End: 2020-02-24

## 2020-02-24 VITALS
OXYGEN SATURATION: 98 % | RESPIRATION RATE: 18 BRPM | TEMPERATURE: 100 F | HEART RATE: 78 BPM | SYSTOLIC BLOOD PRESSURE: 132 MMHG | DIASTOLIC BLOOD PRESSURE: 85 MMHG

## 2020-02-24 LAB
ANION GAP SERPL CALC-SCNC: 12 MMOL/L — SIGNIFICANT CHANGE UP (ref 5–17)
BUN SERPL-MCNC: 9 MG/DL — SIGNIFICANT CHANGE UP (ref 7–23)
CALCIUM SERPL-MCNC: 9.6 MG/DL — SIGNIFICANT CHANGE UP (ref 8.4–10.5)
CHLORIDE SERPL-SCNC: 102 MMOL/L — SIGNIFICANT CHANGE UP (ref 96–108)
CO2 SERPL-SCNC: 24 MMOL/L — SIGNIFICANT CHANGE UP (ref 22–31)
CREAT SERPL-MCNC: 1.03 MG/DL — SIGNIFICANT CHANGE UP (ref 0.5–1.3)
GLUCOSE SERPL-MCNC: 115 MG/DL — HIGH (ref 70–99)
HCT VFR BLD CALC: 24.4 % — LOW (ref 34.5–45)
HGB BLD-MCNC: 7.7 G/DL — LOW (ref 11.5–15.5)
MCHC RBC-ENTMCNC: 27.3 PG — SIGNIFICANT CHANGE UP (ref 27–34)
MCHC RBC-ENTMCNC: 31.6 GM/DL — LOW (ref 32–36)
MCV RBC AUTO: 86.5 FL — SIGNIFICANT CHANGE UP (ref 80–100)
NRBC # BLD: 0 /100 WBCS — SIGNIFICANT CHANGE UP (ref 0–0)
PLATELET # BLD AUTO: 563 K/UL — HIGH (ref 150–400)
POTASSIUM SERPL-MCNC: 4.1 MMOL/L — SIGNIFICANT CHANGE UP (ref 3.5–5.3)
POTASSIUM SERPL-SCNC: 4.1 MMOL/L — SIGNIFICANT CHANGE UP (ref 3.5–5.3)
RBC # BLD: 2.82 M/UL — LOW (ref 3.8–5.2)
RBC # FLD: 15.9 % — HIGH (ref 10.3–14.5)
SODIUM SERPL-SCNC: 138 MMOL/L — SIGNIFICANT CHANGE UP (ref 135–145)
WBC # BLD: 12.17 K/UL — HIGH (ref 3.8–10.5)
WBC # FLD AUTO: 12.17 K/UL — HIGH (ref 3.8–10.5)

## 2020-02-24 PROCEDURE — 87070 CULTURE OTHR SPECIMN AEROBIC: CPT

## 2020-02-24 PROCEDURE — 82803 BLOOD GASES ANY COMBINATION: CPT

## 2020-02-24 PROCEDURE — 96375 TX/PRO/DX INJ NEW DRUG ADDON: CPT | Mod: XU

## 2020-02-24 PROCEDURE — 85306 CLOT INHIBIT PROT S FREE: CPT

## 2020-02-24 PROCEDURE — 87581 M.PNEUMON DNA AMP PROBE: CPT

## 2020-02-24 PROCEDURE — 86901 BLOOD TYPING SEROLOGIC RH(D): CPT

## 2020-02-24 PROCEDURE — 99291 CRITICAL CARE FIRST HOUR: CPT | Mod: 25

## 2020-02-24 PROCEDURE — 85027 COMPLETE CBC AUTOMATED: CPT

## 2020-02-24 PROCEDURE — 86900 BLOOD TYPING SEROLOGIC ABO: CPT

## 2020-02-24 PROCEDURE — 93005 ELECTROCARDIOGRAM TRACING: CPT

## 2020-02-24 PROCEDURE — 83540 ASSAY OF IRON: CPT

## 2020-02-24 PROCEDURE — 84702 CHORIONIC GONADOTROPIN TEST: CPT

## 2020-02-24 PROCEDURE — 87486 CHLMYD PNEUM DNA AMP PROBE: CPT

## 2020-02-24 PROCEDURE — 80053 COMPREHEN METABOLIC PANEL: CPT

## 2020-02-24 PROCEDURE — 87798 DETECT AGENT NOS DNA AMP: CPT

## 2020-02-24 PROCEDURE — 99232 SBSQ HOSP IP/OBS MODERATE 35: CPT

## 2020-02-24 PROCEDURE — 87633 RESP VIRUS 12-25 TARGETS: CPT

## 2020-02-24 PROCEDURE — 83010 ASSAY OF HAPTOGLOBIN QUANT: CPT

## 2020-02-24 PROCEDURE — 87040 BLOOD CULTURE FOR BACTERIA: CPT

## 2020-02-24 PROCEDURE — 93306 TTE W/DOPPLER COMPLETE: CPT

## 2020-02-24 PROCEDURE — 96374 THER/PROPH/DIAG INJ IV PUSH: CPT | Mod: XU

## 2020-02-24 PROCEDURE — 81001 URINALYSIS AUTO W/SCOPE: CPT

## 2020-02-24 PROCEDURE — 82607 VITAMIN B-12: CPT

## 2020-02-24 PROCEDURE — 83550 IRON BINDING TEST: CPT

## 2020-02-24 PROCEDURE — 82746 ASSAY OF FOLIC ACID SERUM: CPT

## 2020-02-24 PROCEDURE — 84484 ASSAY OF TROPONIN QUANT: CPT

## 2020-02-24 PROCEDURE — 86147 CARDIOLIPIN ANTIBODY EA IG: CPT

## 2020-02-24 PROCEDURE — 94660 CPAP INITIATION&MGMT: CPT

## 2020-02-24 PROCEDURE — 85303 CLOT INHIBIT PROT C ACTIVITY: CPT

## 2020-02-24 PROCEDURE — 85300 ANTITHROMBIN III ACTIVITY: CPT

## 2020-02-24 PROCEDURE — 71045 X-RAY EXAM CHEST 1 VIEW: CPT

## 2020-02-24 PROCEDURE — 82728 ASSAY OF FERRITIN: CPT

## 2020-02-24 PROCEDURE — 82947 ASSAY GLUCOSE BLOOD QUANT: CPT

## 2020-02-24 PROCEDURE — 85014 HEMATOCRIT: CPT

## 2020-02-24 PROCEDURE — 80048 BASIC METABOLIC PNL TOTAL CA: CPT

## 2020-02-24 PROCEDURE — 84295 ASSAY OF SERUM SODIUM: CPT

## 2020-02-24 PROCEDURE — 82553 CREATINE MB FRACTION: CPT

## 2020-02-24 PROCEDURE — 83605 ASSAY OF LACTIC ACID: CPT

## 2020-02-24 PROCEDURE — 83615 LACTATE (LD) (LDH) ENZYME: CPT

## 2020-02-24 PROCEDURE — 93308 TTE F-UP OR LMTD: CPT

## 2020-02-24 PROCEDURE — 83690 ASSAY OF LIPASE: CPT

## 2020-02-24 PROCEDURE — 82550 ASSAY OF CK (CPK): CPT

## 2020-02-24 PROCEDURE — 71275 CT ANGIOGRAPHY CHEST: CPT

## 2020-02-24 PROCEDURE — 86850 RBC ANTIBODY SCREEN: CPT

## 2020-02-24 PROCEDURE — 85730 THROMBOPLASTIN TIME PARTIAL: CPT

## 2020-02-24 PROCEDURE — 86146 BETA-2 GLYCOPROTEIN ANTIBODY: CPT

## 2020-02-24 PROCEDURE — 93970 EXTREMITY STUDY: CPT

## 2020-02-24 PROCEDURE — 85610 PROTHROMBIN TIME: CPT

## 2020-02-24 PROCEDURE — 84145 PROCALCITONIN (PCT): CPT

## 2020-02-24 PROCEDURE — 82435 ASSAY OF BLOOD CHLORIDE: CPT

## 2020-02-24 PROCEDURE — 84132 ASSAY OF SERUM POTASSIUM: CPT

## 2020-02-24 PROCEDURE — 83880 ASSAY OF NATRIURETIC PEPTIDE: CPT

## 2020-02-24 PROCEDURE — 81240 F2 GENE: CPT

## 2020-02-24 PROCEDURE — 81241 F5 GENE: CPT

## 2020-02-24 PROCEDURE — 82330 ASSAY OF CALCIUM: CPT

## 2020-02-24 RX ORDER — APIXABAN 2.5 MG/1
1 TABLET, FILM COATED ORAL
Qty: 60 | Refills: 0
Start: 2020-02-24 | End: 2020-03-24

## 2020-02-24 RX ORDER — APIXABAN 2.5 MG/1
2 TABLET, FILM COATED ORAL
Qty: 6 | Refills: 0
Start: 2020-02-24

## 2020-02-24 RX ORDER — FERROUS SULFATE 325(65) MG
1 TABLET ORAL
Qty: 30 | Refills: 0
Start: 2020-02-24 | End: 2020-03-24

## 2020-02-24 RX ADMIN — Medication 325 MILLIGRAM(S): at 14:06

## 2020-02-24 RX ADMIN — OXYCODONE AND ACETAMINOPHEN 1 TABLET(S): 5; 325 TABLET ORAL at 07:07

## 2020-02-24 RX ADMIN — PIPERACILLIN AND TAZOBACTAM 25 GRAM(S): 4; .5 INJECTION, POWDER, LYOPHILIZED, FOR SOLUTION INTRAVENOUS at 01:08

## 2020-02-24 RX ADMIN — APIXABAN 10 MILLIGRAM(S): 2.5 TABLET, FILM COATED ORAL at 05:46

## 2020-02-24 RX ADMIN — OXYCODONE AND ACETAMINOPHEN 1 TABLET(S): 5; 325 TABLET ORAL at 06:40

## 2020-02-24 RX ADMIN — PIPERACILLIN AND TAZOBACTAM 25 GRAM(S): 4; .5 INJECTION, POWDER, LYOPHILIZED, FOR SOLUTION INTRAVENOUS at 09:07

## 2020-02-24 RX ADMIN — ATENOLOL 100 MILLIGRAM(S): 25 TABLET ORAL at 05:46

## 2020-02-24 RX ADMIN — SODIUM CHLORIDE 75 MILLILITER(S): 9 INJECTION INTRAMUSCULAR; INTRAVENOUS; SUBCUTANEOUS at 09:07

## 2020-02-24 NOTE — PROGRESS NOTE ADULT - SUBJECTIVE AND OBJECTIVE BOX
infectious diseases progress note:    Patient is a 45y old  Female who presents with a chief complaint of fevers post procedure, suspected PEs (23 Feb 2020 13:57)        Acute respiratory failure with hypoxia        R   NEUROLOGIC:  No headache, confusion, dizziness, lightheadedness    Allergies    No Known Allergies    Intolerances        ANTIBIOTICS/RELEVANT:  antimicrobials  piperacillin/tazobactam IVPB.. 3.375 Gram(s) IV Intermittent every 8 hours    immunologic:    OTHER:  acetaminophen   Tablet .. 650 milliGRAM(s) Oral every 6 hours PRN  acetaminophen   Tablet .. 650 milliGRAM(s) Oral every 6 hours PRN  apixaban 10 milliGRAM(s) Oral every 12 hours  ATENolol  Tablet 100 milliGRAM(s) Oral daily  bisacodyl 5 milliGRAM(s) Oral every 12 hours PRN  ferrous    sulfate 325 milliGRAM(s) Oral daily  oxycodone    5 mG/acetaminophen 325 mG 1 Tablet(s) Oral every 6 hours PRN  senna 2 Tablet(s) Oral at bedtime  sodium chloride 0.9%. 1000 milliLiter(s) IV Continuous <Continuous>      Objective:  Vital Signs Last 24 Hrs  T(C): 37.3 (24 Feb 2020 05:00), Max: 37.8 (23 Feb 2020 20:48)  T(F): 99.1 (24 Feb 2020 05:00), Max: 100.1 (23 Feb 2020 20:48)  HR: 89 (24 Feb 2020 06:35) (70 - 95)  BP: 133/80 (24 Feb 2020 06:35) (128/70 - 149/89)  BP(mean): --  RR: 18 (24 Feb 2020 05:00) (18 - 18)  SpO2: 99% (24 Feb 2020 05:00) (96% - 99%)    PHYSICAL EXAM:  Constitutional:Well-developed, well nourished--no acute distress  Eyes:TASHA, EOMI  Ear/Nose/Throat: no oral lesion, no sinus tenderness on percussion	  Neck:no JVD, no lymphadenopathy, supple  Respiratory: CTA karin  Cardiovascular: S1S2 RRR, no murmurs  Gastrointestinal:soft, (+) BS, no HSM  Extremities:no e/e/c        LABS:                        7.7    12.17 )-----------( 563      ( 24 Feb 2020 06:06 )             24.4     02-24    138  |  102  |  9   ----------------------------<  115<H>  4.1   |  24  |  1.03    Ca    9.6      24 Feb 2020 06:06              MICROBIOLOGY:    RECENT CULTURES:  02-20 @ 19:20 .Sputum Sputum       Rare polymorphonuclear leukocytes seen per low power field  No squamous epithelial cells  Few Gram Variable Rods seen per oil power field  Few Gram positive cocci in pairs seen per oil power field           Normal Respiratory Florence present    02-20 @ 01:12 .Blood Blood-Peripheral                No growth to date.    02-20 @ 01:08 .Blood Blood-Peripheral                No growth to date.    02-17 @ 15:10 .Blood Blood-Venous                No growth at 5 days.          RESPIRATORY CULTURES:              RADIOLOGY & ADDITIONAL STUDIES:        Pager [phone]  After 5 pm/weekends or if no response :[phone]

## 2020-02-24 NOTE — PROGRESS NOTE ADULT - ASSESSMENT
46 y/o F with PMH of HTN, recent myomectomy (2/11/20 at [place]) requiring PRBC transfusion introp, discharged from [facility] on 2/13 and presents to ER 2/17 with syncopal episode. Found to have PE. Patient states she was having shortness of breath shortly after discharge and cough with thick white phlegm. CT chest with bibasilar atelectasis. Hospital course c/b uptrending leukocytosis/fevers concerning for infectious process-started on abx for PNA

## 2020-02-24 NOTE — PROGRESS NOTE ADULT - ASSESSMENT
44 yo female originally from [facility] admitted with SOB / syncope after recent surgery and found to have bilateral calf DVT and left sided pulmonary embolus    1. DVT /PE   - likely provoked by recent surgery  - tolerating anticoagulation, continue Eliquis 10mg BID until tomorrow, then 5mg bid  - hypercoagulable workup sent, thus far all negative including genetic tests- Factor V leiden and prothrombin; DRVVT and beta 2 glycoprotein antibodies negative; anticardiolipin Ab are pending- If anticardiolipin antibodies positive will need to switch to coumadin  - mammogram 10/2019 - recommending additional images of left breast- needs outpatient follow up- pt is aware  - fever may be related to PE, ID following, on empiric Abx- ceftriaxone changed to zosyn- to be changed to PO    2. normocytic anemia  - iron studies show mixed picture. High ferritin likely due to acute illness. recent heavy GYN bleeding and surgery  - s/p Venofer x 1, on oral iron  - B12/folate normal, no evidence of hemolysis; no further bleeding   - pt refused blood transfusion prior- no [employer] reason, states would accept in emergency setting  - Hg overall stable, 7.7    3. abnormal mammogram  - as above - needs further imaging as outpatient- pt aware    DC planning per Medicine  FU as outpatient

## 2020-02-24 NOTE — PROGRESS NOTE ADULT - SUBJECTIVE AND OBJECTIVE BOX
CHIEF COMPLAINT:Patient is a 45y old  Female who presents with a chief complaint of fever (24 Feb 2020 07:46)    	        PAST MEDICAL & SURGICAL HISTORY:  Hypertension  History of myomectomy          REVIEW OF SYSTEMS:  CONSTITUTIONAL: No fever, weight loss, or fatigue  EYES: No eye pain, visual disturbances, or discharge  NECK: No pain or stiffness  RESPIRATORY: No cough, wheezing, chills or hemoptysis; No Shortness of Breath  CARDIOVASCULAR: No chest pain, palpitations, passing out, dizziness, or leg swelling  GASTROINTESTINAL: No abdominal or epigastric pain. No nausea, vomiting, or hematemesis; No diarrhea or constipation. No melena or hematochezia.  GENITOURINARY: No dysuria, frequency, hematuria, or incontinence  NEUROLOGICAL: No headaches, memory loss, loss of strength, numbness, or tremors  SKIN: No itching, burning, rashes, or lesions   LYMPH Nodes: No enlarged glands  ENDOCRINE: No heat or cold intolerance; No hair loss  MUSCULOSKELETAL: No joint pain or swelling; No muscle, back, or extremity pain    Medications:  MEDICATIONS  (STANDING):  apixaban 10 milliGRAM(s) Oral every 12 hours  ATENolol  Tablet 100 milliGRAM(s) Oral daily  ferrous    sulfate 325 milliGRAM(s) Oral daily  piperacillin/tazobactam IVPB.. 3.375 Gram(s) IV Intermittent every 8 hours  senna 2 Tablet(s) Oral at bedtime  sodium chloride 0.9%. 1000 milliLiter(s) (75 mL/Hr) IV Continuous <Continuous>    MEDICATIONS  (PRN):  acetaminophen   Tablet .. 650 milliGRAM(s) Oral every 6 hours PRN Temp greater or equal to 38C (100.4F)  acetaminophen   Tablet .. 650 milliGRAM(s) Oral every 6 hours PRN Moderate Pain (4 - 6)  bisacodyl 5 milliGRAM(s) Oral every 12 hours PRN Constipation  oxycodone    5 mG/acetaminophen 325 mG 1 Tablet(s) Oral every 6 hours PRN Moderate Pain (4 - 6)    	    PHYSICAL EXAM:  T(C): 36.7 (02-24-20 @ 09:35), Max: 37.8 (02-23-20 @ 20:48)  HR: 84 (02-24-20 @ 09:35) (70 - 95)  BP: 110/73 (02-24-20 @ 09:35) (110/73 - 149/89)  RR: 18 (02-24-20 @ 09:35) (18 - 18)  SpO2: 99% (02-24-20 @ 09:35) (96% - 99%)  Wt(kg): --  I&O's Summary    23 Feb 2020 07:01  -  24 Feb 2020 07:00  --------------------------------------------------------  IN: 2750 mL / OUT: 0 mL / NET: 2750 mL        Appearance: Normal	  HEENT:   Normal oral mucosa, PERRL, EOMI	  Lymphatic: No lymphadenopathy  Cardiovascular: Normal S1 S2, No JVD, No murmurs, No edema  Respiratory: Lungs clear to auscultation	  Psychiatry: A & O x 3, Mood & affect appropriate  Gastrointestinal:  Soft, Non-tender, + BS	  Skin: No rashes, No ecchymoses, No cyanosis	  Neurologic: Non-focal  Extremities: Normal range of motion, No clubbing, cyanosis or edema  Vascular: Peripheral pulses palpable 2+ bilaterally    TELEMETRY: 	    ECG:  	  RADIOLOGY:  OTHER: 	  	  LABS:	 	    CARDIAC MARKERS:                                7.7    12.17 )-----------( 563      ( 24 Feb 2020 06:06 )             24.4     02-24    138  |  102  |  9   ----------------------------<  115<H>  4.1   |  24  |  1.03    Ca    9.6      24 Feb 2020 06:06      proBNP:   Lipid Profile:   HgA1c:   TSH:

## 2020-02-24 NOTE — PROGRESS NOTE ADULT - SUBJECTIVE AND OBJECTIVE BOX
Follow-up Pulm Progress Note    No new respiratory events overnight.  Denies SOB/CP.   Appears well, walking around the unit  98% on RA  Fevers resolved    Medications:  MEDICATIONS  (STANDING):  apixaban 10 milliGRAM(s) Oral every 12 hours  ATENolol  Tablet 100 milliGRAM(s) Oral daily  ferrous    sulfate 325 milliGRAM(s) Oral daily  piperacillin/tazobactam IVPB.. 3.375 Gram(s) IV Intermittent every 8 hours  senna 2 Tablet(s) Oral at bedtime  sodium chloride 0.9%. 1000 milliLiter(s) (75 mL/Hr) IV Continuous <Continuous>    MEDICATIONS  (PRN):  acetaminophen   Tablet .. 650 milliGRAM(s) Oral every 6 hours PRN Temp greater or equal to 38C (100.4F)  acetaminophen   Tablet .. 650 milliGRAM(s) Oral every 6 hours PRN Moderate Pain (4 - 6)  bisacodyl 5 milliGRAM(s) Oral every 12 hours PRN Constipation  oxycodone    5 mG/acetaminophen 325 mG 1 Tablet(s) Oral every 6 hours PRN Moderate Pain (4 - 6)          Vital Signs Last 24 Hrs  T(C): 36.7 (24 Feb 2020 09:35), Max: 37.8 (23 Feb 2020 20:48)  T(F): 98 (24 Feb 2020 09:35), Max: 100.1 (23 Feb 2020 20:48)  HR: 84 (24 Feb 2020 09:35) (70 - 95)  BP: 110/73 (24 Feb 2020 09:35) (110/73 - 149/89)  BP(mean): --  RR: 18 (24 Feb 2020 09:35) (18 - 18)  SpO2: 99% (24 Feb 2020 09:35) (96% - 99%) on RA          02-23 @ 07:01  -  02-24 @ 07:00  --------------------------------------------------------  IN: 2750 mL / OUT: 0 mL / NET: 2750 mL          LABS:                        7.7    12.17 )-----------( 563      ( 24 Feb 2020 06:06 )             24.4     02-24    138  |  102  |  9   ----------------------------<  115<H>  4.1   |  24  |  1.03    Ca    9.6      24 Feb 2020 06:06            CAPILLARY BLOOD GLUCOSE                            CULTURES: (if applicable)  Culture Results:   Normal Respiratory Florence present (02-20 @ 19:20)  Culture Results:   No growth to date. (02-20 @ 01:12)  Culture Results:   No growth to date. (02-20 @ 01:08)  Culture Results:   No growth at 5 days. (02-17 @ 15:10)  Culture Results:   No growth at 5 days. (02-17 @ 15:10)    Most recent blood culture -- 02-20 @ 19:20   -- -- .Sputum Sputum 02-20 @ 19:20  Most recent blood culture -- 02-20 @ 01:12   -- -- .Blood Blood-Peripheral 02-20 @ 01:12  Most recent blood culture -- 02-20 @ 01:08   -- -- .Blood Blood-Peripheral 02-20 @ 01:08    Blood culture 02-20 @ 19:20  --    Rare polymorphonuclear leukocytes seen per low power field  No squamous epithelial cells  Few Gram Variable Rods seen per oil power field  Few Gram positive cocci in pairs seen per oil power field  --  --  --    Urine culture    -->      Physical Examination:  PULM: Decreased BS at bases  CVS: S1, S2 heard    RADIOLOGY REVIEWED  CTA chest: < from: CT Angio Chest w/ IV Cont (02.17.20 @ 15:20) >    FINDINGS:    PULMONARY ARTERIES: Acute pulmonary embolus in the left distal main pulmonary artery extending into the lobar pulmonary arteries. The main pulmonary artery is normal in caliber.    LUNGS AND AIRWAYS: Patent central airways.  Consolidations in the bilateral lower lobes. Groundglass opacity in the lingula, linear atelectasis    PLEURA: No pleural effusion.    MEDIASTINUM AND JAMEE: No lymphadenopathy.    VESSELS: The aorta is normal in course and caliber.    HEART: Heart size is normal. No pericardial effusion.    CHEST WALL AND LOWER NECK: Within normal limits.    VISUALIZED UPPER ABDOMEN: Within normal limits.    BONES: Within normal limits.    IMPRESSION:     Acute pulmonary embolus in the left distal main pulmonary artery extending into the lobar pulmonary arteries.    Bilateral lower lobe consolidations.    < end of copied text >

## 2020-02-24 NOTE — DISCHARGE NOTE NURSING/CASE MANAGEMENT/SOCIAL WORK - PATIENT PORTAL LINK FT
You can access the FollowMyHealth Patient Portal offered by [facility] by registering at the following website: [email or web address]. By joining [facility]’s FollowMyHealth portal, you will also be able to view your health information using other applications (apps) compatible with our system.

## 2020-02-24 NOTE — PROGRESS NOTE ADULT - SUBJECTIVE AND OBJECTIVE BOX
Chief Complaint: fu    History of Present Illness: feels  well; ambulating without difficulty; no dyspnea, no WAYNE, cough improving- with slight blood streaks; no palpitations, no n/v/abd pain, +BM- no melena/brbpr, with multiple BM during day but not watery; no leg pain      MEDICATIONS  (STANDING):  apixaban 10 milliGRAM(s) Oral every 12 hours  ATENolol  Tablet 100 milliGRAM(s) Oral daily  ferrous    sulfate 325 milliGRAM(s) Oral daily  piperacillin/tazobactam IVPB.. 3.375 Gram(s) IV Intermittent every 8 hours  senna 2 Tablet(s) Oral at bedtime  sodium chloride 0.9%. 1000 milliLiter(s) (75 mL/Hr) IV Continuous <Continuous>    MEDICATIONS  (PRN):  acetaminophen   Tablet .. 650 milliGRAM(s) Oral every 6 hours PRN Temp greater or equal to 38C (100.4F)  acetaminophen   Tablet .. 650 milliGRAM(s) Oral every 6 hours PRN Moderate Pain (4 - 6)  bisacodyl 5 milliGRAM(s) Oral every 12 hours PRN Constipation  oxycodone    5 mG/acetaminophen 325 mG 1 Tablet(s) Oral every 6 hours PRN Moderate Pain (4 - 6)      Allergies    No Known Allergies    Intolerances        Vital Signs Last 24 Hrs  T(C): 37.5 (24 Feb 2020 13:38), Max: 37.8 (23 Feb 2020 20:48)  T(F): 99.5 (24 Feb 2020 13:38), Max: 100.1 (23 Feb 2020 20:48)  HR: 78 (24 Feb 2020 13:38) (70 - 95)  BP: 132/85 (24 Feb 2020 13:38) (110/73 - 149/89)  BP(mean): --  RR: 18 (24 Feb 2020 13:38) (18 - 18)  SpO2: 98% (24 Feb 2020 13:38) (97% - 99%)    PHYSICAL EXAM  General: adult in NAD  HEENT: clear oropharynx, anicteric sclera, pink conjunctiva  Neck: supple  CV: normal S1/S2   Lungs: clear to auscultation, no wheezes, no rales  Abdomen: soft non-tender non-distended, positive bowel sounds  Ext: no clubbing cyanosis or edema  Skin: no rashes and no petechiae  Lymph Nodes: No LAD in neck  Neuro: alert and oriented X 3, no focal deficits    LABS:                          7.7    12.17 )-----------( 563      ( 24 Feb 2020 06:06 )             24.4         Mean Cell Volume : 86.5 fl  Mean Cell Hemoglobin : 27.3 pg  Mean Cell Hemoglobin Concentration : 31.6 gm/dL  Auto Neutrophil # : x  Auto Lymphocyte # : x  Auto Monocyte # : x  Auto Eosinophil # : x  Auto Basophil # : x  Auto Neutrophil % : x  Auto Lymphocyte % : x  Auto Monocyte % : x  Auto Eosinophil % : x  Auto Basophil % : x      Serial CBC's  02-24 @ 06:06  Hct-24.4 / Hgb-7.7 / Plat-563 / RBC-2.82 / WBC-12.17  Serial CBC's  02-23 @ 06:35  Hct-23.7 / Hgb-7.6 / Plat-497 / RBC-2.75 / WBC-15.51  Serial CBC's  02-22 @ 06:47  Hct-23.5 / Hgb-7.7 / Plat-469 / RBC-2.74 / WBC-18.22  Serial CBC's  02-21 @ 06:10  Hct-24.0 / Hgb-7.5 / Plat-393 / RBC-2.77 / WBC-18.23      02-24    138  |  102  |  9   ----------------------------<  115<H>  4.1   |  24  |  1.03    Ca    9.6      24 Feb 2020 06:06            Ferritin, Serum: 192 ng/mL (02-20 @ 00:44)  Iron - Total Binding Capacity.: 246 ug/dL (02-20 @ 00:44)  Vitamin B12, Serum: 564 pg/mL (02-20 @ 00:44)  Folate, Serum: 16.3 ng/mL (02-20 @ 00:44)              Radiology:

## 2020-02-24 NOTE — PROGRESS NOTE ADULT - PROBLEM SELECTOR PLAN 2
L main PE extending to lobar arteries and bilateral calf DVT provoked from recent surgery  -c/w Eliquis, change to 5mg BID after lead in of 7 days of 10mg BID completed tomorrow   -Will need at least 3 months of AC for provoked clot   -Trend H/H  -No evidence of RV strain on TTE  -Cardiac enzymes negative  -No episodes of recorded hypotension  -Hypercoagulable w/u thus far negative. f/u anticardiolipin ab  -Negative Factor V mutation, negative prothrombin gene mutation

## 2020-02-24 NOTE — PROGRESS NOTE ADULT - ATTENDING COMMENTS
Agree with above NP note.  cv stable  cont current tx  a/c per med  echo nl  d/c planning
Agree with above NP note.  cv stable  cont current tx  cont ac per medicine  echo normal
Agree with above NP note.  cv stable  no tele events  echo with normal lv/rv function  abx per pulmo   cont a/c per pulmo
Patient seen and examined.  Agree with above NP note.  cv stable  no dyspnea, chest pain, orthopnea  cont a/c for dvt, pe  f/u echo   abx per medicine
Despite pt clinically ok, given recent hysterectomy and significant basal atelectasis/pna? on CT with recurring fever would broaden abx to zosyn.

## 2020-02-24 NOTE — PROGRESS NOTE ADULT - SUBJECTIVE AND OBJECTIVE BOX
CARDIOLOGY FOLLOW UP - Dr. Lakhani    CC oob in shower w/ no dizziness , cp/sob   family at bedside - pt feels well, possible dispo planning today       PHYSICAL EXAM:  T(C): 36.7 (02-24-20 @ 09:35), Max: 37.8 (02-23-20 @ 20:48)  HR: 84 (02-24-20 @ 09:35) (70 - 95)  BP: 110/73 (02-24-20 @ 09:35) (110/73 - 149/89)  RR: 18 (02-24-20 @ 09:35) (18 - 18)  SpO2: 99% (02-24-20 @ 09:35) (96% - 99%)  Wt(kg): --  I&O's Summary    23 Feb 2020 07:01  -  24 Feb 2020 07:00  --------------------------------------------------------  IN: 2750 mL / OUT: 0 mL / NET: 2750 mL        Appearance: Normal	  Cardiovascular: Normal S1 S2,RRR, No JVD, No murmurs  Respiratory: Lungs clear to auscultation	  Gastrointestinal:  Soft, Non-tender, + BS	  Extremities: Normal range of motion, No clubbing, cyanosis or edema        MEDICATIONS  (STANDING):  apixaban 10 milliGRAM(s) Oral every 12 hours  ATENolol  Tablet 100 milliGRAM(s) Oral daily  ferrous    sulfate 325 milliGRAM(s) Oral daily  piperacillin/tazobactam IVPB.. 3.375 Gram(s) IV Intermittent every 8 hours  senna 2 Tablet(s) Oral at bedtime  sodium chloride 0.9%. 1000 milliLiter(s) (75 mL/Hr) IV Continuous <Continuous>      TELEMETRY: 	    ECG:  	  RADIOLOGY:   DIAGNOSTIC TESTING:  [ ] Echocardiogram:  [ ]  Catheterization:  [ ] Stress Test:    OTHER: 	    LABS:	 	                                7.7    12.17 )-----------( 563      ( 24 Feb 2020 06:06 )             24.4     02-24    138  |  102  |  9   ----------------------------<  115<H>  4.1   |  24  |  1.03    Ca    9.6      24 Feb 2020 06:06

## 2020-02-24 NOTE — DISCHARGE NOTE PROVIDER - CARE PROVIDER_API CALL
Franklyn Camarena)  Internal Medicine  [id number] [address] [place] [address]  Phone: [phone]  Fax: [phone]  Established Patient  Follow Up Time: 1-3 days

## 2020-02-24 NOTE — DISCHARGE NOTE PROVIDER - NSDCCPCAREPLAN_GEN_ALL_CORE_FT
PRINCIPAL DISCHARGE DIAGNOSIS  Diagnosis: Pulmonary embolism  Assessment and Plan of Treatment: Take your anticoagulation (Eliquis) as directed.  Follow up with your health care provider within one week. Call for appointment.  If you develop shortness of breath or if your shortness of breath worsens call your Health Care Provider or go to the Emergency Department.        SECONDARY DISCHARGE DIAGNOSES  Diagnosis: Pneumonia  Assessment and Plan of Treatment: -Continue with Augmentin for 2 more days

## 2020-02-24 NOTE — PROGRESS NOTE ADULT - ASSESSMENT
45 year old admitted after recent discharge for myomectomy with anemia  admitted for syncope and sob and found to have a PE. chest xray is clear but there are consolidations in lower lobes felt to be collapse     doubt she had pna  no signs        She reports improvement in her overall well being  and is non toxic and comfortable on RA sitting in a chair    Receiving a short course of Zosyn for a possible superimposed bacterial infection but in all likelihood her fevers from the  DVTs and PE  Plan to complete 3-5 days of antibiotics         from ID view can be discharged to complete ab on 2/26  with either current therapy or po Augmentin

## 2020-02-24 NOTE — DISCHARGE NOTE PROVIDER - HOSPITAL COURSE
44 y/o F with PMH of HTN, recent myomectomy (2/11/20 at [place]) requiring PRBC transfusion, intropes, discharged from [facility] on 2/13 and presents to ER 2/17 with syncopal episode. Found to have PE and started on Eliquis. Hospital course c/b uptrending leukocytosis/fevers, CT chest with bibasilar atelectasis, concerning for infectious process-started on Zosyn for pneumonia. Patient stable for discharge - to continue with Augmentin x2 days and Eliquis for at least 3 months.

## 2020-02-24 NOTE — PROGRESS NOTE ADULT - PROBLEM SELECTOR PLAN 1
Fevers improved on Zosyn   -CTA with bibasilar opacities  -c/w Zosyn, change to PO Augmentin when ok with ID to complete 2 more days of abx  -Sputum culture with normal respiratory lisa  -UA negative, Blood cultures, RVP negative

## 2020-02-24 NOTE — PROGRESS NOTE ADULT - ASSESSMENT
45 year old female with hx of HTN and fibroids s/p abdominal myomectomy  (2/11/20 at [place]) requiring PRBC transfusion introp, presenting with  syncopal episode. Found to have PE    1. Syncope   in the setting of acute PE/ hypovolemia- anemia    no acs,  HS trop negative; ecg with sinus tachycardia secondary to pna/ acute pe   no active cp or sob   CTA chest with + PE/ bl lower lobe consolidation   bedside ED echo with no pericardial effusion   no occult arrhythmias identified   Echo with nl LV/ RV fx   continue a/c    2. Acute PE / DVT   likely provoked from recent surgery  LE dopplers with acute bl DVT  Echo with nl LV/ RV fx   on Eliquis     3. PNA   abx per pulm  repeat chest xray noted   work up/ abx  per pulm ? ID   rvp negative / repeat  bcx negative     no cv objection to dc planning

## 2020-02-24 NOTE — DISCHARGE NOTE PROVIDER - NSDCMRMEDTOKEN_GEN_ALL_CORE_FT
apixaban 5 mg oral tablet: 2 tab(s) orally every 12 hours  Eliquis 10mg BID to be completed tomorrow 2/25 (3 more doses)  ascorbic acid 500 mg oral tablet: 1 tab(s) orally 2 times a day  atenolol 100 mg oral tablet: 1 tab(s) orally once a day  Augmentin 500 mg-125 mg oral tablet: 1 tab(s) orally 2 times a day   Colace 100 mg oral capsule: 1 cap(s) orally once a day   Eliquis 5 mg oral tablet: 1 tab(s) orally 2 times a day   ferrous sulfate 325 mg (65 mg elemental iron) oral delayed release tablet: 1 tab(s) orally once a day   ibuprofen 600 mg oral tablet: 1 tab(s) orally every 6 hours, As Needed -for moderate pain   oxycodone-acetaminophen 5 mg-325 mg oral tablet: 1 tab(s) orally every 6 hours -Moderate Pain (4 - 6) - for severe pain MDD:4 amoxicillin-clavulanate 875 mg-125 mg oral tablet: 1 tab(s) orally 2 times a day   apixaban 5 mg oral tablet: 2 tab(s) orally every 12 hours  Eliquis 10mg BID to be completed tomorrow 2/25 (3 more doses)  ascorbic acid 500 mg oral tablet: 1 tab(s) orally 2 times a day  atenolol 100 mg oral tablet: 1 tab(s) orally once a day  Colace 100 mg oral capsule: 1 cap(s) orally once a day   Eliquis 5 mg oral tablet: 1 tab(s) orally 2 times a day   ferrous sulfate 325 mg (65 mg elemental iron) oral delayed release tablet: 1 tab(s) orally once a day   ibuprofen 600 mg oral tablet: 1 tab(s) orally every 6 hours, As Needed -for moderate pain   oxycodone-acetaminophen 5 mg-325 mg oral tablet: 1 tab(s) orally every 6 hours -Moderate Pain (4 - 6) - for severe pain MDD:4

## 2020-02-25 ENCOUNTER — APPOINTMENT (OUTPATIENT)
Dept: OBGYN | Facility: CLINIC | Age: 45
End: 2020-02-25
Payer: COMMERCIAL

## 2020-02-25 VITALS
WEIGHT: 167.44 LBS | BODY MASS INDEX: 27.03 KG/M2 | DIASTOLIC BLOOD PRESSURE: 84 MMHG | SYSTOLIC BLOOD PRESSURE: 120 MMHG

## 2020-02-25 DIAGNOSIS — Z09 ENCOUNTER FOR FOLLOW-UP EXAMINATION AFTER COMPLETED TREATMENT FOR CONDITIONS OTHER THAN MALIGNANT NEOPLASM: ICD-10-CM

## 2020-02-25 LAB
CARDIOLIPIN AB SER-ACNC: NEGATIVE — SIGNIFICANT CHANGE UP
CULTURE RESULTS: SIGNIFICANT CHANGE UP
CULTURE RESULTS: SIGNIFICANT CHANGE UP
SPECIMEN SOURCE: SIGNIFICANT CHANGE UP
SPECIMEN SOURCE: SIGNIFICANT CHANGE UP

## 2020-02-25 PROCEDURE — 99024 POSTOP FOLLOW-UP VISIT: CPT

## 2020-02-25 RX ORDER — IBUPROFEN 600 MG/1
600 TABLET, FILM COATED ORAL 3 TIMES DAILY
Qty: 90 | Refills: 2 | Status: ACTIVE | COMMUNITY
Start: 2020-02-25 | End: 1900-01-01

## 2020-02-25 RX ORDER — ATENOLOL 100 MG/1
100 TABLET ORAL DAILY
Qty: 30 | Refills: 2 | Status: ACTIVE | COMMUNITY
Start: 2020-02-25 | End: 1900-01-01

## 2020-02-25 NOTE — COUNSELING
[Breast Self Exam] : breast self exam [Exercise] : exercise [Nutrition] : nutrition [STD (testing, results, tx)] : STD (testing, results, tx) [Vitamins/Supplements] : vitamins/supplements [Safe Sexual Practices] : safe sexual practices

## 2020-02-26 RX ORDER — APIXABAN 2.5 MG/1
1 TABLET, FILM COATED ORAL
Qty: 60 | Refills: 0
Start: 2020-02-26 | End: 2020-03-26

## 2020-03-19 ENCOUNTER — APPOINTMENT (OUTPATIENT)
Dept: OBGYN | Facility: CLINIC | Age: 45
End: 2020-03-19
Payer: COMMERCIAL

## 2020-03-19 VITALS — WEIGHT: 170 LBS | DIASTOLIC BLOOD PRESSURE: 86 MMHG | SYSTOLIC BLOOD PRESSURE: 122 MMHG | BODY MASS INDEX: 27.44 KG/M2

## 2020-03-19 DIAGNOSIS — R10.30 LOWER ABDOMINAL PAIN, UNSPECIFIED: ICD-10-CM

## 2020-03-19 PROCEDURE — 99024 POSTOP FOLLOW-UP VISIT: CPT

## 2020-10-19 ENCOUNTER — APPOINTMENT (OUTPATIENT)
Dept: ULTRASOUND IMAGING | Facility: IMAGING CENTER | Age: 45
End: 2020-10-19

## 2020-10-26 PROBLEM — Z00.00 ENCOUNTER FOR PREVENTIVE HEALTH EXAMINATION: Noted: 2020-10-26

## 2020-10-29 ENCOUNTER — APPOINTMENT (OUTPATIENT)
Dept: OBGYN | Facility: CLINIC | Age: 45
End: 2020-10-29
Payer: COMMERCIAL

## 2020-10-29 ENCOUNTER — APPOINTMENT (OUTPATIENT)
Dept: OBGYN | Facility: CLINIC | Age: 45
End: 2020-10-29

## 2020-10-29 DIAGNOSIS — Z82.49 FAMILY HISTORY OF ISCHEMIC HEART DISEASE AND OTHER DISEASES OF THE CIRCULATORY SYSTEM: ICD-10-CM

## 2020-10-29 DIAGNOSIS — Z78.9 OTHER SPECIFIED HEALTH STATUS: ICD-10-CM

## 2020-10-29 DIAGNOSIS — N94.6 DYSMENORRHEA, UNSPECIFIED: ICD-10-CM

## 2020-10-29 DIAGNOSIS — Z12.39 ENCOUNTER FOR OTHER SCREENING FOR MALIGNANT NEOPLASM OF BREAST: ICD-10-CM

## 2020-10-29 PROCEDURE — 99396 PREV VISIT EST AGE 40-64: CPT

## 2020-10-29 PROCEDURE — 99072 ADDL SUPL MATRL&STAF TM PHE: CPT

## 2020-10-29 NOTE — COUNSELING
[Nutrition/ Exercise/ Weight Management] : nutrition, exercise, weight management [Body Image] : body image [Vitamins/Supplements] : vitamins/supplements [Sunscreen] : sunscreen [Drugs/Alcohol] : drugs, alcohol [Breast Self Exam] : breast self exam

## 2020-11-02 LAB
C TRACH RRNA SPEC QL NAA+PROBE: NOT DETECTED
HPV HIGH+LOW RISK DNA PNL CVX: NOT DETECTED
N GONORRHOEA RRNA SPEC QL NAA+PROBE: NOT DETECTED
SOURCE TP AMPLIFICATION: NORMAL

## 2020-11-04 LAB — CYTOLOGY CVX/VAG DOC THIN PREP: NORMAL

## 2020-11-05 ENCOUNTER — OUTPATIENT (OUTPATIENT)
Dept: OUTPATIENT SERVICES | Facility: HOSPITAL | Age: 45
LOS: 1 days | End: 2020-11-05
Payer: COMMERCIAL

## 2020-11-05 ENCOUNTER — APPOINTMENT (OUTPATIENT)
Age: 45
End: 2020-11-05
Payer: COMMERCIAL

## 2020-11-05 DIAGNOSIS — Z00.8 ENCOUNTER FOR OTHER GENERAL EXAMINATION: ICD-10-CM

## 2020-11-05 DIAGNOSIS — Z98.890 OTHER SPECIFIED POSTPROCEDURAL STATES: Chronic | ICD-10-CM

## 2020-11-05 DIAGNOSIS — I82.403 ACUTE EMBOLISM AND THROMBOSIS OF UNSPECIFIED DEEP VEINS OF LOWER EXTREMITY, BILATERAL: ICD-10-CM

## 2020-11-05 PROCEDURE — 93970 EXTREMITY STUDY: CPT

## 2020-11-05 PROCEDURE — 93970 EXTREMITY STUDY: CPT | Mod: 26

## 2020-12-22 ENCOUNTER — APPOINTMENT (OUTPATIENT)
Dept: ULTRASOUND IMAGING | Facility: IMAGING CENTER | Age: 45
End: 2020-12-22

## 2020-12-22 ENCOUNTER — APPOINTMENT (OUTPATIENT)
Dept: MAMMOGRAPHY | Facility: IMAGING CENTER | Age: 45
End: 2020-12-22

## 2021-04-22 ENCOUNTER — APPOINTMENT (OUTPATIENT)
Dept: OBGYN | Facility: CLINIC | Age: 46
End: 2021-04-22
Payer: COMMERCIAL

## 2021-04-22 VITALS
DIASTOLIC BLOOD PRESSURE: 82 MMHG | SYSTOLIC BLOOD PRESSURE: 131 MMHG | BODY MASS INDEX: 28.94 KG/M2 | WEIGHT: 179.31 LBS

## 2021-04-22 DIAGNOSIS — Z01.419 ENCOUNTER FOR GYNECOLOGICAL EXAMINATION (GENERAL) (ROUTINE) W/OUT ABNORMAL FINDINGS: ICD-10-CM

## 2021-04-22 PROCEDURE — 99213 OFFICE O/P EST LOW 20 MIN: CPT

## 2021-04-22 PROCEDURE — 99072 ADDL SUPL MATRL&STAF TM PHE: CPT

## 2021-05-06 ENCOUNTER — APPOINTMENT (OUTPATIENT)
Dept: PULMONOLOGY | Facility: CLINIC | Age: 46
End: 2021-05-06
Payer: COMMERCIAL

## 2021-05-06 VITALS
DIASTOLIC BLOOD PRESSURE: 79 MMHG | HEART RATE: 70 BPM | TEMPERATURE: 97.8 F | BODY MASS INDEX: 29.66 KG/M2 | HEIGHT: 65 IN | WEIGHT: 178 LBS | OXYGEN SATURATION: 100 % | SYSTOLIC BLOOD PRESSURE: 124 MMHG

## 2021-05-06 PROCEDURE — 99072 ADDL SUPL MATRL&STAF TM PHE: CPT

## 2021-05-06 PROCEDURE — 99203 OFFICE O/P NEW LOW 30 MIN: CPT

## 2021-05-06 RX ORDER — ALUMINUM HYDROXIDE AND MAGNESIUM TRISILICATE 80; 14.2 MG/1; MG/1
80-14.2 TABLET, CHEWABLE ORAL
Qty: 270 | Refills: 3 | Status: ACTIVE | COMMUNITY
Start: 2021-05-06

## 2021-05-06 NOTE — HISTORY OF PRESENT ILLNESS
[Never] : never [TextBox_4] : 46 year old lady with h/o pulmonary embolism following myomectomy for fibroids in Feb 2020. She received anticoagulants for 5 months.\par She is here because of spitting out whitish material when she wakes up sometimes and sometimes after eating. She has acid taste in the mouth..  There is no dyspnea. \par She drinks tea before going to bed and sleeps from 11 PM and wakes at 5 AM. 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 \par She never smoked. No h/o alcohol abuse.

## 2021-05-06 NOTE — DISCUSSION/SUMMARY
[FreeTextEntry1] : The patient has symptoms of bringing out whitish material, cough Vs reflux. \par She has been drinking tea close to bed time. She was advised not to eat or drink close to bedtime.\par Will do PFT. Will treat with Gaviscon.

## 2021-05-10 ENCOUNTER — APPOINTMENT (OUTPATIENT)
Dept: PULMONOLOGY | Facility: CLINIC | Age: 46
End: 2021-05-10
Payer: COMMERCIAL

## 2021-05-10 LAB — SARS-COV-2 N GENE NPH QL NAA+PROBE: NOT DETECTED

## 2021-05-10 PROCEDURE — 94729 DIFFUSING CAPACITY: CPT

## 2021-05-10 PROCEDURE — 94727 GAS DIL/WSHOT DETER LNG VOL: CPT

## 2021-05-10 PROCEDURE — 94010 BREATHING CAPACITY TEST: CPT

## 2021-05-10 PROCEDURE — 99072 ADDL SUPL MATRL&STAF TM PHE: CPT

## 2021-06-21 ENCOUNTER — APPOINTMENT (OUTPATIENT)
Dept: PULMONOLOGY | Facility: CLINIC | Age: 46
End: 2021-06-21
Payer: COMMERCIAL

## 2021-06-21 VITALS
HEART RATE: 76 BPM | DIASTOLIC BLOOD PRESSURE: 84 MMHG | HEIGHT: 65 IN | BODY MASS INDEX: 29.99 KG/M2 | OXYGEN SATURATION: 100 % | WEIGHT: 180 LBS | SYSTOLIC BLOOD PRESSURE: 126 MMHG | TEMPERATURE: 98 F

## 2021-06-21 DIAGNOSIS — K21.9 GASTRO-ESOPHAGEAL REFLUX DISEASE W/OUT ESOPHAGITIS: ICD-10-CM

## 2021-06-21 DIAGNOSIS — R05 COUGH: ICD-10-CM

## 2021-06-21 PROCEDURE — 99213 OFFICE O/P EST LOW 20 MIN: CPT

## 2021-06-21 PROCEDURE — 99072 ADDL SUPL MATRL&STAF TM PHE: CPT

## 2021-06-21 NOTE — HISTORY OF PRESENT ILLNESS
[Never] : never [TextBox_4] : 46 year old lady with h/o bringing out whitish sputum is here for follow up. She is doing much better on diet and gaviscon.\par She is trying to lose weight.\par She does not smoke.\par There are no pets at home.\par The patient had pulmonary function testing which reveals restrictive impairment.

## 2021-12-20 ENCOUNTER — APPOINTMENT (OUTPATIENT)
Dept: PULMONOLOGY | Facility: CLINIC | Age: 46
End: 2021-12-20

## 2021-12-27 ENCOUNTER — APPOINTMENT (OUTPATIENT)
Dept: PULMONOLOGY | Facility: CLINIC | Age: 46
End: 2021-12-27

## 2022-11-18 NOTE — ED PROVIDER NOTE - DATE/TIME 3
DME stated ,Patient did not wish to rent to own CPAP from [place], asked them to cancel his order    17-Feb-2020 13:01

## 2023-12-24 NOTE — PATIENT PROFILE ADULT - LIVES WITH
Normal vision: sees adequately in most situations; can see medication labels, newsprint
spouse
gin all pertinent systems normal

## 2024-05-02 NOTE — PATIENT PROFILE ADULT - NSPROHMCARDIOSYMPCOND_GEN_A_NUR
Cimzia Counseling:  I discussed with the patient the risks of Cimzia including but not limited to immunosuppression, allergic reactions and infections.  The patient understands that monitoring is required including a PPD at baseline and must alert us or the primary physician if symptoms of infection or other concerning signs are noted. Albendazole Counseling:  I discussed with the patient the risks of albendazole including but not limited to cytopenia, kidney damage, nausea/vomiting and severe allergy.  The patient understands that this medication is being used in an off-label manner. Bexarotene Pregnancy And Lactation Text: This medication is Pregnancy Category X and should not be given to women who are pregnant or may become pregnant. This medication should not be used if you are breast feeding. Otezla Counseling: The side effects of Otezla were discussed with the patient, including but not limited to worsening or new depression, weight loss, diarrhea, nausea, upper respiratory tract infection, and headache. Patient instructed to call the office should any adverse effect occur.  The patient verbalized understanding of the proper use and possible adverse effects of Otezla.  All the patient's questions and concerns were addressed. Eucrisa Pregnancy And Lactation Text: This medication has not been assigned a Pregnancy Risk Category but animal studies failed to show danger with the topical medication. It is unknown if the medication is excreted in breast milk. Fluconazole Counseling:  Patient counseled regarding adverse effects of fluconazole including but not limited to headache, diarrhea, nausea, upset stomach, liver function test abnormalities, taste disturbance, and stomach pain.  There is a rare possibility of liver failure that can occur when taking fluconazole.  The patient understands that monitoring of LFTs and kidney function test may be required, especially at baseline. The patient verbalized understanding of the proper use and possible adverse effects of fluconazole.  All of the patient's questions and concerns were addressed. Thalidomide Pregnancy And Lactation Text: This medication is Pregnancy Category X and is absolutely contraindicated during pregnancy. It is unknown if it is excreted in breast milk. hypertension Picato Counseling:  I discussed with the patient the risks of Picato including but not limited to erythema, scaling, itching, weeping, crusting, and pain. Humira Pregnancy And Lactation Text: This medication is Pregnancy Category B and is considered safe during pregnancy. It is unknown if this medication is excreted in breast milk. Cephalexin Pregnancy And Lactation Text: This medication is Pregnancy Category B and considered safe during pregnancy.  It is also excreted in breast milk but can be used safely for shorter doses. Terbinafine Pregnancy And Lactation Text: This medication is Pregnancy Category B and is considered safe during pregnancy. It is also excreted in breast milk and breast feeding isn't recommended. Xeljanz Counseling: I discussed with the patient the risks of Xeljanz therapy including increased risk of infection, liver issues, headache, diarrhea, or cold symptoms. Live vaccines should be avoided. They were instructed to call if they have any problems. Cellcept Pregnancy And Lactation Text: This medication is Pregnancy Category D and isn't considered safe during pregnancy. It is unknown if this medication is excreted in breast milk. Opzelura Counseling:  I discussed with the patient the risks of Opzelura including but not limited to nasopharngitis, bronchitis, ear infection, eosinophila, hives, diarrhea, folliculitis, tonsillitis, and rhinorrhea.  Taken orally, this medication has been linked to serious infections; higher rate of mortality; malignancy and lymphoproliferative disorders; major adverse cardiovascular events; thrombosis; thrombocytopenia, anemia, and neutropenia; and lipid elevations. Topical Retinoid Pregnancy And Lactation Text: This medication is Pregnancy Category C. It is unknown if this medication is excreted in breast milk. Finasteride Pregnancy And Lactation Text: This medication is absolutely contraindicated during pregnancy. It is unknown if it is excreted in breast milk. Simponi Counseling:  I discussed with the patient the risks of golimumab including but not limited to myelosuppression, immunosuppression, autoimmune hepatitis, demyelinating diseases, lymphoma, and serious infections.  The patient understands that monitoring is required including a PPD at baseline and must alert us or the primary physician if symptoms of infection or other concerning signs are noted. Arava Counseling:  Patient counseled regarding adverse effects of Arava including but not limited to nausea, vomiting, abnormalities in liver function tests. Patients may develop mouth sores, rash, diarrhea, and abnormalities in blood counts. The patient understands that monitoring is required including LFTs and blood counts.  There is a rare possibility of scarring of the liver and lung problems that can occur when taking methotrexate. Persistent nausea, loss of appetite, pale stools, dark urine, cough, and shortness of breath should be reported immediately. Patient advised to discontinue Arava treatment and consult with a physician prior to attempting conception. The patient will have to undergo a treatment to eliminate Arava from the body prior to conception. Quinolones Counseling:  I discussed with the patient the risks of fluoroquinolones including but not limited to GI upset, allergic reaction, drug rash, diarrhea, dizziness, photosensitivity, yeast infections, liver function test abnormalities, tendonitis/tendon rupture. Carac Counseling:  I discussed with the patient the risks of Carac including but not limited to erythema, scaling, itching, weeping, crusting, and pain. Wartpeel Counseling:  I discussed with the patient the risks of Wartpeel including but not limited to erythema, scaling, itching, weeping, crusting, and pain. Nsaids Counseling: NSAID Counseling: I discussed with the patient that NSAIDs should be taken with food. Prolonged use of NSAIDs can result in the development of stomach ulcers.  Patient advised to stop taking NSAIDs if abdominal pain occurs.  The patient verbalized understanding of the proper use and possible adverse effects of NSAIDs.  All of the patient's questions and concerns were addressed. Gabapentin Pregnancy And Lactation Text: This medication is Pregnancy Category C and isn't considered safe during pregnancy. It is excreted in breast milk. Libtayo Pregnancy And Lactation Text: This medication is contraindicated in pregnancy and when breast feeding. Otezla Pregnancy And Lactation Text: This medication is Pregnancy Category C and it isn't known if it is safe during pregnancy. It is unknown if it is excreted in breast milk. Tranexamic Acid Counseling:  Patient advised of the small risk of bleeding problems with tranexamic acid. They were also instructed to call if they developed any nausea, vomiting or diarrhea. All of the patient's questions and concerns were addressed. Cimzia Pregnancy And Lactation Text: This medication crosses the placenta but can be considered safe in certain situations. Cimzia may be excreted in breast milk. Isotretinoin Counseling: Patient should get monthly blood tests, not donate blood, not drive at night if vision affected, not share medication, and not undergo elective surgery for 6 months after tx completed. Side effects reviewed, pt to contact office should one occur. Albendazole Pregnancy And Lactation Text: This medication is Pregnancy Category C and it isn't known if it is safe during pregnancy. It is also excreted in breast milk. Cibinqo Counseling: I discussed with the patient the risks of Cibinqo therapy including but not limited to common cold, nausea, headache, cold sores, increased blood CPK levels, dizziness, UTIs, fatigue, acne, and vomitting. Live vaccines should be avoided.  This medication has been linked to serious infections; higher rate of mortality; malignancy and lymphoproliferative disorders; major adverse cardiovascular events; thrombosis; thrombocytopenia and lymphopenia; lipid elevations; and retinal detachment. Ilumya Counseling: I discussed with the patient the risks of tildrakizumab including but not limited to immunosuppression, malignancy, posterior leukoencephalopathy syndrome, and serious infections.  The patient understands that monitoring is required including a PPD at baseline and must alert us or the primary physician if symptoms of infection or other concerning signs are noted. Hydroquinone Counseling:  Patient advised that medication may result in skin irritation, lightening (hypopigmentation), dryness, and burning.  In the event of skin irritation, the patient was advised to reduce the amount of the drug applied or use it less frequently.  Rarely, spots that are treated with hydroquinone can become darker (pseudoochronosis).  Should this occur, patient instructed to stop medication and call the office. The patient verbalized understanding of the proper use and possible adverse effects of hydroquinone.  All of the patient's questions and concerns were addressed. Fluconazole Pregnancy And Lactation Text: This medication is Pregnancy Category C and it isn't know if it is safe during pregnancy. It is also excreted in breast milk. Carac Pregnancy And Lactation Text: This medication is Pregnancy Category X and contraindicated in pregnancy and in women who may become pregnant. It is unknown if this medication is excreted in breast milk. Simponi Pregnancy And Lactation Text: The risk during pregnancy and breastfeeding is uncertain with this medication. Xelrufinaz Pregnancy And Lactation Text: This medication is Pregnancy Category D and is not considered safe during pregnancy.  The risk during breast feeding is also uncertain. Clindamycin Counseling: I counseled the patient regarding use of clindamycin as an antibiotic for prophylactic and/or therapeutic purposes. Clindamycin is active against numerous classes of bacteria, including skin bacteria. Side effects may include nausea, diarrhea, gastrointestinal upset, rash, hives, yeast infections, and in rare cases, colitis. Cyclophosphamide Counseling:  I discussed with the patient the risks of cyclophosphamide including but not limited to hair loss, hormonal abnormalities, decreased fertility, abdominal pain, diarrhea, nausea and vomiting, bone marrow suppression and infection. The patient understands that monitoring is required while taking this medication. Birth Control Pills Counseling: Birth Control Pill Counseling: I discussed with the patient the potential side effects of OCPs including but not limited to increased risk of stroke, heart attack, thrombophlebitis, deep venous thrombosis, hepatic adenomas, breast changes, GI upset, headaches, and depression.  The patient verbalized understanding of the proper use and possible adverse effects of OCPs. All of the patient's questions and concerns were addressed. Tazorac Counseling:  Patient advised that medication is irritating and drying.  Patient may need to apply sparingly and wash off after an hour before eventually leaving it on overnight.  The patient verbalized understanding of the proper use and possible adverse effects of tazorac.  All of the patient's questions and concerns were addressed. Odomzo Counseling- I discussed with the patient the risks of Odomzo including but not limited to nausea, vomiting, diarrhea, constipation, weight loss, changes in the sense of taste, decreased appetite, muscle spasms, and hair loss.  The patient verbalized understanding of the proper use and possible adverse effects of Odomzo.  All of the patient's questions and concerns were addressed. Nsaids Pregnancy And Lactation Text: These medications are considered safe up to 30 weeks gestation. It is excreted in breast milk. Isotretinoin Pregnancy And Lactation Text: This medication is Pregnancy Category X and is considered extremely dangerous during pregnancy. It is unknown if it is excreted in breast milk. Glycopyrrolate Counseling:  I discussed with the patient the risks of glycopyrrolate including but not limited to skin rash, drowsiness, dry mouth, difficulty urinating, and blurred vision. Tranexamic Acid Pregnancy And Lactation Text: It is unknown if this medication is safe during pregnancy or breast feeding. Cimetidine Counseling:  I discussed with the patient the risks of Cimetidine including but not limited to gynecomastia, headache, diarrhea, nausea, drowsiness, arrhythmias, pancreatitis, skin rashes, psychosis, bone marrow suppression and kidney toxicity. Ivermectin Counseling:  Patient instructed to take medication on an empty stomach with a full glass of water.  Patient informed of potential adverse effects including but not limited to nausea, diarrhea, dizziness, itching, and swelling of the extremities or lymph nodes.  The patient verbalized understanding of the proper use and possible adverse effects of ivermectin.  All of the patient's questions and concerns were addressed. Cosentyx Counseling:  I discussed with the patient the risks of Cosentyx including but not limited to worsening of Crohn's disease, immunosuppression, allergic reactions and infections.  The patient understands that monitoring is required including a PPD at baseline and must alert us or the primary physician if symptoms of infection or other concerning signs are noted. Oxybutynin Counseling:  I discussed with the patient the risks of oxybutynin including but not limited to skin rash, drowsiness, dry mouth, difficulty urinating, and blurred vision. Tremfya Counseling: I discussed with the patient the risks of guselkumab including but not limited to immunosuppression, serious infections, and drug reactions.  The patient understands that monitoring is required including a PPD at baseline and must alert us or the primary physician if symptoms of infection or other concerning signs are noted. Cibinqo Pregnancy And Lactation Text: It is unknown if this medication will adversely affect pregnancy or breast feeding.  You should not take this medication if you are currently pregnant or planning a pregnancy or while breastfeeding. Griseofulvin Counseling:  I discussed with the patient the risks of griseofulvin including but not limited to photosensitivity, cytopenia, liver damage, nausea/vomiting and severe allergy.  The patient understands that this medication is best absorbed when taken with a fatty meal (e.g., ice cream or french fries). Protopic Counseling: Patient may experience a mild burning sensation during topical application. Protopic is not approved in children less than 2 years of age. There have been case reports of hematologic and skin malignancies in patients using topical calcineurin inhibitors although causality is questionable. Calcipotriene Counseling:  I discussed with the patient the risks of calcipotriene including but not limited to erythema, scaling, itching, and irritation. Clindamycin Pregnancy And Lactation Text: This medication can be used in pregnancy if certain situations. Clindamycin is also present in breast milk. Skyrizi Counseling: I discussed with the patient the risks of risankizumab-rzaa including but not limited to immunosuppression, and serious infections.  The patient understands that monitoring is required including a PPD at baseline and must alert us or the primary physician if symptoms of infection or other concerning signs are noted. Cyclophosphamide Pregnancy And Lactation Text: This medication is Pregnancy Category D and it isn't considered safe during pregnancy. This medication is excreted in breast milk. Winlevi Counseling:  I discussed with the patient the risks of topical clascoterone including but not limited to erythema, scaling, itching, and stinging. Patient voiced their understanding. Birth Control Pills Pregnancy And Lactation Text: This medication should be avoided if pregnant and for the first 30 days post-partum. Tazorac Pregnancy And Lactation Text: This medication is not safe during pregnancy. It is unknown if this medication is excreted in breast milk. Detail Level: Detailed Rifampin Counseling: I discussed with the patient the risks of rifampin including but not limited to liver damage, kidney damage, red-orange body fluids, nausea/vomiting and severe allergy. Clofazimine Counseling:  I discussed with the patient the risks of clofazimine including but not limited to skin and eye pigmentation, liver damage, nausea/vomiting, gastrointestinal bleeding and allergy. Glycopyrrolate Pregnancy And Lactation Text: This medication is Pregnancy Category B and is considered safe during pregnancy. It is unknown if it is excreted breast milk. High Dose Vitamin A Counseling: Side effects reviewed, pt to contact office should one occur. Valtrex Counseling: I discussed with the patient the risks of valacyclovir including but not limited to kidney damage, nausea, vomiting and severe allergy.  The patient understands that if the infection seems to be worsening or is not improving, they are to call. Oxybutynin Pregnancy And Lactation Text: This medication is Pregnancy Category B and is considered safe during pregnancy. It is unknown if it is excreted in breast milk. Imiquimod Counseling:  I discussed with the patient the risks of imiquimod including but not limited to erythema, scaling, itching, weeping, crusting, and pain.  Patient understands that the inflammatory response to imiquimod is variable from person to person and was educated regarded proper titration schedule.  If flu-like symptoms develop, patient knows to discontinue the medication and contact us. Olumiant Counseling: I discussed with the patient the risks of Olumiant therapy including but not limited to upper respiratory tract infections, shingles, cold sores, and nausea. Live vaccines should be avoided.  This medication has been linked to serious infections; higher rate of mortality; malignancy and lymphoproliferative disorders; major adverse cardiovascular events; thrombosis; gastrointestinal perforations; neutropenia; lymphopenia; anemia; liver enzyme elevations; and lipid elevations. Infliximab Counseling:  I discussed with the patient the risks of infliximab including but not limited to myelosuppression, immunosuppression, autoimmune hepatitis, demyelinating diseases, lymphoma, and serious infections.  The patient understands that monitoring is required including a PPD at baseline and must alert us or the primary physician if symptoms of infection or other concerning signs are noted. Cyclosporine Counseling:  I discussed with the patient the risks of cyclosporine including but not limited to hypertension, gingival hyperplasia,myelosuppression, immunosuppression, liver damage, kidney damage, neurotoxicity, lymphoma, and serious infections. The patient understands that monitoring is required including baseline blood pressure, CBC, CMP, lipid panel and uric acid, and then 1-2 times monthly CMP and blood pressure. Griseofulvin Pregnancy And Lactation Text: This medication is Pregnancy Category X and is known to cause serious birth defects. It is unknown if this medication is excreted in breast milk but breast feeding should be avoided. Protopic Pregnancy And Lactation Text: This medication is Pregnancy Category C. It is unknown if this medication is excreted in breast milk when applied topically. Calcipotriene Pregnancy And Lactation Text: This medication has not been proven safe during pregnancy. It is unknown if this medication is excreted in breast milk. Doxycycline Counseling:  Patient counseled regarding possible photosensitivity and increased risk for sunburn.  Patient instructed to avoid sunlight, if possible.  When exposed to sunlight, patients should wear protective clothing, sunglasses, and sunscreen.  The patient was instructed to call the office immediately if the following severe adverse effects occur:  hearing changes, easy bruising/bleeding, severe headache, or vision changes.  The patient verbalized understanding of the proper use and possible adverse effects of doxycycline.  All of the patient's questions and concerns were addressed. Hydroxychloroquine Counseling:  I discussed with the patient that a baseline ophthalmologic exam is needed at the start of therapy and every year thereafter while on therapy. A CBC may also be warranted for monitoring.  The side effects of this medication were discussed with the patient, including but not limited to agranulocytosis, aplastic anemia, seizures, rashes, retinopathy, and liver toxicity. Patient instructed to call the office should any adverse effect occur.  The patient verbalized understanding of the proper use and possible adverse effects of Plaquenil.  All the patient's questions and concerns were addressed. Rifampin Pregnancy And Lactation Text: This medication is Pregnancy Category C and it isn't know if it is safe during pregnancy. It is also excreted in breast milk and should not be used if you are breast feeding. Winlevi Pregnancy And Lactation Text: This medication is considered safe during pregnancy and breastfeeding. Spironolactone Counseling: Patient advised regarding risks of diarrhea, abdominal pain, hyperkalemia, birth defects (for female patients), liver toxicity and renal toxicity. The patient may need blood work to monitor liver and kidney function and potassium levels while on therapy. The patient verbalized understanding of the proper use and possible adverse effects of spironolactone.  All of the patient's questions and concerns were addressed. Topical Clindamycin Counseling: Patient counseled that this medication may cause skin irritation or allergic reactions.  In the event of skin irritation, the patient was advised to reduce the amount of the drug applied or use it less frequently.   The patient verbalized understanding of the proper use and possible adverse effects of clindamycin.  All of the patient's questions and concerns were addressed. High Dose Vitamin A Pregnancy And Lactation Text: High dose vitamin A therapy is contraindicated during pregnancy and breast feeding. Doxepin Counseling:  Patient advised that the medication is sedating and not to drive a car after taking this medication. Patient informed of potential adverse effects including but not limited to dry mouth, urinary retention, and blurry vision.  The patient verbalized understanding of the proper use and possible adverse effects of doxepin.  All of the patient's questions and concerns were addressed. Propranolol Counseling:  I discussed with the patient the risks of propranolol including but not limited to low heart rate, low blood pressure, low blood sugar, restlessness and increased cold sensitivity. They should call the office if they experience any of these side effects. Xolair Counseling:  Patient informed of potential adverse effects including but not limited to fever, muscle aches, rash and allergic reactions.  The patient verbalized understanding of the proper use and possible adverse effects of Xolair.  All of the patient's questions and concerns were addressed. Valtrex Pregnancy And Lactation Text: this medication is Pregnancy Category B and is considered safe during pregnancy. This medication is not directly found in breast milk but it's metabolite acyclovir is present. Dupixent Counseling: I discussed with the patient the risks of dupilumab including but not limited to eye inflammation and irritation, cold sores, injection site reactions, allergic reactions and increased risk of parasitic infection. The patient understands that monitoring is required and they must alert us or the primary physician if symptoms of infection or other concerning signs are noted. Azithromycin Counseling:  I discussed with the patient the risks of azithromycin including but not limited to GI upset, allergic reaction, drug rash, diarrhea, and yeast infections. Olumiant Pregnancy And Lactation Text: Based on animal studies, Olumiant may cause embryo-fetal harm when administered to pregnant women.  The medication should not be used in pregnancy.  Breastfeeding is not recommended during treatment. Doxycycline Pregnancy And Lactation Text: This medication is Pregnancy Category D and not consider safe during pregnancy. It is also excreted in breast milk but is considered safe for shorter treatment courses. Cyclosporine Pregnancy And Lactation Text: This medication is Pregnancy Category C and it isn't know if it is safe during pregnancy. This medication is excreted in breast milk. Drysol Counseling:  I discussed with the patient the risks of drysol/aluminum chloride including but not limited to skin rash, itching, irritation, burning. Itraconazole Counseling:  I discussed with the patient the risks of itraconazole including but not limited to liver damage, nausea/vomiting, neuropathy, and severe allergy.  The patient understands that this medication is best absorbed when taken with acidic beverages such as non-diet cola or ginger ale.  The patient understands that monitoring is required including baseline LFTs and repeat LFTs at intervals.  The patient understands that they are to contact us or the primary physician if concerning signs are noted. Dutasteride Male Counseling: Dustasteride Counseling:  I discussed with the patient the risks of use of dutasteride including but not limited to decreased libido, decreased ejaculate volume, and gynecomastia. Women who can become pregnant should not handle medication.  All of the patient's questions and concerns were addressed. Rhofade Counseling: Rhofade is a topical medication which can decrease superficial blood flow where applied. Side effects are uncommon and include stinging, redness and allergic reactions. Sarecycline Counseling: Patient advised regarding possible photosensitivity and discoloration of the teeth, skin, lips, tongue and gums.  Patient instructed to avoid sunlight, if possible.  When exposed to sunlight, patients should wear protective clothing, sunglasses, and sunscreen.  The patient was instructed to call the office immediately if the following severe adverse effects occur:  hearing changes, easy bruising/bleeding, severe headache, or vision changes.  The patient verbalized understanding of the proper use and possible adverse effects of sarecycline.  All of the patient's questions and concerns were addressed. 5-Fu Counseling: 5-Fluorouracil Counseling:  I discussed with the patient the risks of 5-fluorouracil including but not limited to erythema, scaling, itching, weeping, crusting, and pain. Stelara Counseling:  I discussed with the patient the risks of ustekinumab including but not limited to immunosuppression, malignancy, posterior leukoencephalopathy syndrome, and serious infections.  The patient understands that monitoring is required including a PPD at baseline and must alert us or the primary physician if symptoms of infection or other concerning signs are noted. Spironolactone Pregnancy And Lactation Text: This medication can cause feminization of the male fetus and should be avoided during pregnancy. The active metabolite is also found in breast milk. Zyclara Counseling:  I discussed with the patient the risks of imiquimod including but not limited to erythema, scaling, itching, weeping, crusting, and pain.  Patient understands that the inflammatory response to imiquimod is variable from person to person and was educated regarded proper titration schedule.  If flu-like symptoms develop, patient knows to discontinue the medication and contact us. Hydroxychloroquine Pregnancy And Lactation Text: This medication has been shown to cause fetal harm but it isn't assigned a Pregnancy Risk Category. There are small amounts excreted in breast milk. Adbry Counseling: I discussed with the patient the risks of tralokinumab including but not limited to eye infection and irritation, cold sores, injection site reactions, worsening of asthma, allergic reactions and increased risk of parasitic infection.  Live vaccines should be avoided while taking tralokinumab. The patient understands that monitoring is required and they must alert us or the primary physician if symptoms of infection or other concerning signs are noted. Colchicine Counseling:  Patient counseled regarding adverse effects including but not limited to stomach upset (nausea, vomiting, stomach pain, or diarrhea).  Patient instructed to limit alcohol consumption while taking this medication.  Colchicine may reduce blood counts especially with prolonged use.  The patient understands that monitoring of kidney function and blood counts may be required, especially at baseline. The patient verbalized understanding of the proper use and possible adverse effects of colchicine.  All of the patient's questions and concerns were addressed. Propranolol Pregnancy And Lactation Text: This medication is Pregnancy Category C and it isn't known if it is safe during pregnancy. It is excreted in breast milk. Doxepin Pregnancy And Lactation Text: This medication is Pregnancy Category C and it isn't known if it is safe during pregnancy. It is also excreted in breast milk and breast feeding isn't recommended. Olanzapine Counseling- I discussed with the patient the common side effects of olanzapine including but are not limited to: lack of energy, dry mouth, increased appetite, sleepiness, tremor, constipation, dizziness, changes in behavior, or restlessness.  Explained that teenagers are more likely to experience headaches, abdominal pain, pain in the arms or legs, tiredness, and sleepiness.  Serious side effects include but are not limited: increased risk of death in elderly patients who are confused, have memory loss, or dementia-related psychosis; hyperglycemia; increased cholesterol and triglycerides; and weight gain. Dupixent Pregnancy And Lactation Text: This medication likely crosses the placenta but the risk for the fetus is uncertain. This medication is excreted in breast milk. Drysol Pregnancy And Lactation Text: This medication is considered safe during pregnancy and breast feeding. Include Pregnancy/Lactation Warning?: No Minoxidil Counseling: Minoxidil is a topical medication which can increase blood flow where it is applied. It is uncertain how this medication increases hair growth. Side effects are uncommon and include stinging and allergic reactions. Xolair Pregnancy And Lactation Text: This medication is Pregnancy Category B and is considered safe during pregnancy. This medication is excreted in breast milk. Rinvoq Counseling: I discussed with the patient the risks of Rinvoq therapy including but not limited to upper respiratory tract infections, shingles, cold sores, bronchitis, nausea, cough, fever, acne, and headache. Live vaccines should be avoided.  This medication has been linked to serious infections; higher rate of mortality; malignancy and lymphoproliferative disorders; major adverse cardiovascular events; thrombosis; thrombocytopenia, anemia, and neutropenia; lipid elevations; liver enzyme elevations; and gastrointestinal perforations. Azithromycin Pregnancy And Lactation Text: This medication is considered safe during pregnancy and is also secreted in breast milk. Metronidazole Counseling:  I discussed with the patient the risks of metronidazole including but not limited to seizures, nausea/vomiting, a metallic taste in the mouth, nausea/vomiting and severe allergy. Rituxan Counseling:  I discussed with the patient the risks of Rituxan infusions. Side effects can include infusion reactions, severe drug rashes including mucocutaneous reactions, reactivation of latent hepatitis and other infections and rarely progressive multifocal leukoencephalopathy.  All of the patient's questions and concerns were addressed. Methotrexate Counseling:  Patient counseled regarding adverse effects of methotrexate including but not limited to nausea, vomiting, abnormalities in liver function tests. Patients may develop mouth sores, rash, diarrhea, and abnormalities in blood counts. The patient understands that monitoring is required including LFT's and blood counts.  There is a rare possibility of scarring of the liver and lung problems that can occur when taking methotrexate. Persistent nausea, loss of appetite, pale stools, dark urine, cough, and shortness of breath should be reported immediately. Patient advised to discontinue methotrexate treatment at least three months before attempting to become pregnant.  I discussed the need for folate supplements while taking methotrexate.  These supplements can decrease side effects during methotrexate treatment. The patient verbalized understanding of the proper use and possible adverse effects of methotrexate.  All of the patient's questions and concerns were addressed. Dutasteride Female Counseling: Dutasteride Counseling:  I discussed with the patient the risks of use of dutasteride including but not limited to decreased libido and sexual dysfunction. Explained the teratogenic nature of the medication and stressed the importance of not getting pregnant during treatment. All of the patient's questions and concerns were addressed. Rhofade Pregnancy And Lactation Text: This medication has not been assigned a Pregnancy Risk Category. It is unknown if the medication is excreted in breast milk. Erythromycin Counseling:  I discussed with the patient the risks of erythromycin including but not limited to GI upset, allergic reaction, drug rash, diarrhea, increase in liver enzymes, and yeast infections. Topical Ketoconazole Counseling: Patient counseled that this medication may cause skin irritation or allergic reactions.  In the event of skin irritation, the patient was advised to reduce the amount of the drug applied or use it less frequently.   The patient verbalized understanding of the proper use and possible adverse effects of ketoconazole.  All of the patient's questions and concerns were addressed. Sarecycline Pregnancy And Lactation Text: This medication is Pregnancy Category D and not consider safe during pregnancy. It is also excreted in breast milk. Azelaic Acid Counseling: Patient counseled that medicine may cause skin irritation and to avoid applying near the eyes.  In the event of skin irritation, the patient was advised to reduce the amount of the drug applied or use it less frequently.   The patient verbalized understanding of the proper use and possible adverse effects of azelaic acid.  All of the patient's questions and concerns were addressed. Adbry Pregnancy And Lactation Text: It is unknown if this medication will adversely affect pregnancy or breast feeding. Low Dose Naltrexone Counseling- I discussed with the patient the potential risks and side effects of low dose naltrexone including but not limited to: more vivid dreams, headaches, nausea, vomiting, abdominal pain, fatigue, dizziness, and anxiety. Olanzapine Pregnancy And Lactation Text: This medication is pregnancy category C.   There are no adequate and well controlled trials with olanzapine in pregnant females.  Olanzapine should be used during pregnancy only if the potential benefit justifies the potential risk to the fetus.   In a study in lactating healthy women, olanzapine was excreted in breast milk.  It is recommended that women taking olanzapine should not breast feed. SSKI Counseling:  I discussed with the patient the risks of SSKI including but not limited to thyroid abnormalities, metallic taste, GI upset, fever, headache, acne, arthralgias, paraesthesias, lymphadenopathy, easy bleeding, arrhythmias, and allergic reaction. Acitretin Counseling:  I discussed with the patient the risks of acitretin including but not limited to hair loss, dry lips/skin/eyes, liver damage, hyperlipidemia, depression/suicidal ideation, photosensitivity.  Serious rare side effects can include but are not limited to pancreatitis, pseudotumor cerebri, bony changes, clot formation/stroke/heart attack.  Patient understands that alcohol is contraindicated since it can result in liver toxicity and significantly prolong the elimination of the drug by many years. Rinvoq Pregnancy And Lactation Text: Based on animal studies, Rinvoq may cause embryo-fetal harm when administered to pregnant women.  The medication should not be used in pregnancy.  Breastfeeding is not recommended during treatment and for 6 days after the last dose. Bactrim Counseling:  I discussed with the patient the risks of sulfa antibiotics including but not limited to GI upset, allergic reaction, drug rash, diarrhea, dizziness, photosensitivity, and yeast infections.  Rarely, more serious reactions can occur including but not limited to aplastic anemia, agranulocytosis, methemoglobinemia, blood dyscrasias, liver or kidney failure, lung infiltrates or desquamative/blistering drug rashes. Elidel Counseling: Patient may experience a mild burning sensation during topical application. Elidel is not approved in children less than 2 years of age. There have been case reports of hematologic and skin malignancies in patients using topical calcineurin inhibitors although causality is questionable. Enbrel Counseling:  I discussed with the patient the risks of etanercept including but not limited to myelosuppression, immunosuppression, autoimmune hepatitis, demyelinating diseases, lymphoma, and infections.  The patient understands that monitoring is required including a PPD at baseline and must alert us or the primary physician if symptoms of infection or other concerning signs are noted. Hydroxyzine Counseling: Patient advised that the medication is sedating and not to drive a car after taking this medication.  Patient informed of potential adverse effects including but not limited to dry mouth, urinary retention, and blurry vision.  The patient verbalized understanding of the proper use and possible adverse effects of hydroxyzine.  All of the patient's questions and concerns were addressed. Azathioprine Counseling:  I discussed with the patient the risks of azathioprine including but not limited to myelosuppression, immunosuppression, hepatotoxicity, lymphoma, and infections.  The patient understands that monitoring is required including baseline LFTs, Creatinine, possible TPMP genotyping and weekly CBCs for the first month and then every 2 weeks thereafter.  The patient verbalized understanding of the proper use and possible adverse effects of azathioprine.  All of the patient's questions and concerns were addressed. Dutasteride Pregnancy And Lactation Text: This medication is absolutely contraindicated in women, especially during pregnancy and breast feeding. Feminization of male fetuses is possible if taking while pregnant. Metronidazole Pregnancy And Lactation Text: This medication is Pregnancy Category B and considered safe during pregnancy.  It is also excreted in breast milk. Erythromycin Pregnancy And Lactation Text: This medication is Pregnancy Category B and is considered safe during pregnancy. It is also excreted in breast milk. Ketoconazole Counseling:   Patient counseled regarding improving absorption with orange juice.  Adverse effects include but are not limited to breast enlargement, headache, diarrhea, nausea, upset stomach, liver function test abnormalities, taste disturbance, and stomach pain.  There is a rare possibility of liver failure that can occur when taking ketoconazole. The patient understands that monitoring of LFTs may be required, especially at baseline. The patient verbalized understanding of the proper use and possible adverse effects of ketoconazole.  All of the patient's questions and concerns were addressed. Solaraze Counseling:  I discussed with the patient the risks of Solaraze including but not limited to erythema, scaling, itching, weeping, crusting, and pain. Rituxan Pregnancy And Lactation Text: This medication is Pregnancy Category C and it isn't know if it is safe during pregnancy. It is unknown if this medication is excreted in breast milk but similar antibodies are known to be excreted. Methotrexate Pregnancy And Lactation Text: This medication is Pregnancy Category X and is known to cause fetal harm. This medication is excreted in breast milk. Tetracycline Counseling: Patient counseled regarding possible photosensitivity and increased risk for sunburn.  Patient instructed to avoid sunlight, if possible.  When exposed to sunlight, patients should wear protective clothing, sunglasses, and sunscreen.  The patient was instructed to call the office immediately if the following severe adverse effects occur:  hearing changes, easy bruising/bleeding, severe headache, or vision changes.  The patient verbalized understanding of the proper use and possible adverse effects of tetracycline.  All of the patient's questions and concerns were addressed. Patient understands to avoid pregnancy while on therapy due to potential birth defects. Dapsone Counseling: I discussed with the patient the risks of dapsone including but not limited to hemolytic anemia, agranulocytosis, rashes, methemoglobinemia, kidney failure, peripheral neuropathy, headaches, GI upset, and liver toxicity.  Patients who start dapsone require monitoring including baseline LFTs and weekly CBCs for the first month, then every month thereafter.  The patient verbalized understanding of the proper use and possible adverse effects of dapsone.  All of the patient's questions and concerns were addressed. Taltz Counseling: I discussed with the patient the risks of ixekizumab including but not limited to immunosuppression, serious infections, worsening of inflammatory bowel disease and drug reactions.  The patient understands that monitoring is required including a PPD at baseline and must alert us or the primary physician if symptoms of infection or other concerning signs are noted. Erivedge Counseling- I discussed with the patient the risks of Erivedge including but not limited to nausea, vomiting, diarrhea, constipation, weight loss, changes in the sense of taste, decreased appetite, muscle spasms, and hair loss.  The patient verbalized understanding of the proper use and possible adverse effects of Erivedge.  All of the patient's questions and concerns were addressed. Low Dose Naltrexone Pregnancy And Lactation Text: Naltrexone is pregnancy category C.  There have been no adequate and well-controlled studies in pregnant women.  It should be used in pregnancy only if the potential benefit justifies the potential risk to the fetus.   Limited data indicates that naltrexone is minimally excreted into breastmilk. Acitretin Pregnancy And Lactation Text: This medication is Pregnancy Category X and should not be given to women who are pregnant or may become pregnant in the future. This medication is excreted in breast milk. Bimzelx Counseling:  I discussed with the patient the risks of Bimzelx including but not limited to depression, immunosuppression, allergic reactions and infections.  The patient understands that monitoring is required including a PPD at baseline and must alert us or the primary physician if symptoms of infection or other concerning signs are noted. Opioid Counseling: I discussed with the patient the potential side effects of opioids including but not limited to addiction, altered mental status, and depression. I stressed avoiding alcohol, benzodiazepines, muscle relaxants and sleep aids unless specifically okayed by a physician. The patient verbalized understanding of the proper use and possible adverse effects of opioids. All of the patient's questions and concerns were addressed. They were instructed to flush the remaining pills down the toilet if they did not need them for pain. Oral Minoxidil Counseling- I discussed with the patient the risks of oral minoxidil including but not limited to shortness of breath, swelling of the feet or ankles, dizziness, lightheadedness, unwanted hair growth and allergic reaction.  The patient verbalized understanding of the proper use and possible adverse effects of oral minoxidil.  All of the patient's questions and concerns were addressed. Sski Pregnancy And Lactation Text: This medication is Pregnancy Category D and isn't considered safe during pregnancy. It is excreted in breast milk. Mirvaso Counseling: Mirvaso is a topical medication which can decrease superficial blood flow where applied. Side effects are uncommon and include stinging, redness and allergic reactions. Hydroxyzine Pregnancy And Lactation Text: This medication is not safe during pregnancy and should not be taken. It is also excreted in breast milk and breast feeding isn't recommended. Ketoconazole Pregnancy And Lactation Text: This medication is Pregnancy Category C and it isn't know if it is safe during pregnancy. It is also excreted in breast milk and breast feeding isn't recommended. Siliq Counseling:  I discussed with the patient the risks of Siliq including but not limited to new or worsening depression, suicidal thoughts and behavior, immunosuppression, malignancy, posterior leukoencephalopathy syndrome, and serious infections.  The patient understands that monitoring is required including a PPD at baseline and must alert us or the primary physician if symptoms of infection or other concerning signs are noted. There is also a special program designed to monitor depression which is required with Siliq. Benzoyl Peroxide Counseling: Patient counseled that medicine may cause skin irritation and bleach clothing.  In the event of skin irritation, the patient was advised to reduce the amount of the drug applied or use it less frequently.   The patient verbalized understanding of the proper use and possible adverse effects of benzoyl peroxide.  All of the patient's questions and concerns were addressed. Bactrim Pregnancy And Lactation Text: This medication is Pregnancy Category D and is known to cause fetal risk.  It is also excreted in breast milk. Sotyktu Counseling:  I discussed the most common side effects of Sotyktu including: common cold, sore throat, sinus infections, cold sores, canker sores, folliculitis, and acne.  I also discussed more serious side effects of Sotyktu including but not limited to: serious allergic reactions; increased risk for infections such as TB; cancers such as lymphomas; rhabdomyolysis and elevated CPK; and elevated triglycerides and liver enzymes.  Minocycline Counseling: Patient advised regarding possible photosensitivity and discoloration of the teeth, skin, lips, tongue and gums.  Patient instructed to avoid sunlight, if possible.  When exposed to sunlight, patients should wear protective clothing, sunglasses, and sunscreen.  The patient was instructed to call the office immediately if the following severe adverse effects occur:  hearing changes, easy bruising/bleeding, severe headache, or vision changes.  The patient verbalized understanding of the proper use and possible adverse effects of minocycline.  All of the patient's questions and concerns were addressed. Niacinamide Counseling: I recommended taking niacin or niacinamide, also know as vitamin B3, twice daily. Recent evidence suggests that taking vitamin B3 (500 mg twice daily) can reduce the risk of actinic keratoses and non-melanoma skin cancers. Side effects of vitamin B3 include flushing and headache. Prednisone Counseling:  I discussed with the patient the risks of prolonged use of prednisone including but not limited to weight gain, insomnia, osteoporosis, mood changes, diabetes, susceptibility to infection, glaucoma and high blood pressure.  In cases where prednisone use is prolonged, patients should be monitored with blood pressure checks, serum glucose levels and an eye exam.  Additionally, the patient may need to be placed on GI prophylaxis, PCP prophylaxis, and calcium and vitamin D supplementation and/or a bisphosphonate.  The patient verbalized understanding of the proper use and the possible adverse effects of prednisone.  All of the patient's questions and concerns were addressed. Finasteride Male Counseling: Finasteride Counseling:  I discussed with the patient the risks of use of finasteride including but not limited to decreased libido, decreased ejaculate volume, gynecomastia, and depression. Women should not handle medication.  All of the patient's questions and concerns were addressed. Topical Sulfur Applications Counseling: Topical Sulfur Counseling: Patient counseled that this medication may cause skin irritation or allergic reactions.  In the event of skin irritation, the patient was advised to reduce the amount of the drug applied or use it less frequently.   The patient verbalized understanding of the proper use and possible adverse effects of topical sulfur application.  All of the patient's questions and concerns were addressed. Solaraze Pregnancy And Lactation Text: This medication is Pregnancy Category B and is considered safe. There is some data to suggest avoiding during the third trimester. It is unknown if this medication is excreted in breast milk. Dapsone Pregnancy And Lactation Text: This medication is Pregnancy Category C and is not considered safe during pregnancy or breast feeding. Bimzelx Pregnancy And Lactation Text: This medication crosses the placenta and the safety is uncertain during pregnancy. It is unknown if this medication is present in breast milk. Bexarotene Counseling:  I discussed with the patient the risks of bexarotene including but not limited to hair loss, dry lips/skin/eyes, liver abnormalities, hyperlipidemia, pancreatitis, depression/suicidal ideation, photosensitivity, drug rash/allergic reactions, hypothyroidism, anemia, leukopenia, infection, cataracts, and teratogenicity.  Patient understands that they will need regular blood tests to check lipid profile, liver function tests, white blood cell count, thyroid function tests and pregnancy test if applicable. Thalidomide Counseling: I discussed with the patient the risks of thalidomide including but not limited to birth defects, anxiety, weakness, chest pain, dizziness, cough and severe allergy. Humira Counseling:  I discussed with the patient the risks of adalimumab including but not limited to myelosuppression, immunosuppression, autoimmune hepatitis, demyelinating diseases, lymphoma, and serious infections.  The patient understands that monitoring is required including a PPD at baseline and must alert us or the primary physician if symptoms of infection or other concerning signs are noted. Opioid Pregnancy And Lactation Text: These medications can lead to premature delivery and should be avoided during pregnancy. These medications are also present in breast milk in small amounts. Oral Minoxidil Pregnancy And Lactation Text: This medication should only be used when clearly needed if you are pregnant, attempting to become pregnant or breast feeding. Eucrisa Counseling: Patient may experience a mild burning sensation during topical application. Eucrisa is not approved in children less than 3 months of age. Cephalexin Counseling: I counseled the patient regarding use of cephalexin as an antibiotic for prophylactic and/or therapeutic purposes. Cephalexin (commonly prescribed under brand name Keflex) is a cephalosporin antibiotic which is active against numerous classes of bacteria, including most skin bacteria. Side effects may include nausea, diarrhea, gastrointestinal upset, rash, hives, yeast infections, and in rare cases, hepatitis, kidney disease, seizures, fever, confusion, neurologic symptoms, and others. Patients with severe allergies to penicillin medications are cautioned that there is about a 10% incidence of cross-reactivity with cephalosporins. When possible, patients with penicillin allergies should use alternatives to cephalosporins for antibiotic therapy. Sotyktu Pregnancy And Lactation Text: There is insufficient data to evaluate whether or not Sotyktu is safe to use during pregnancy.   It is not known if Sotyktu passes into breast milk and whether or not it is safe to use when breastfeeding.   Opzelura Pregnancy And Lactation Text: There is insufficient data to evaluate drug-associated risk for major birth defects, miscarriage, or other adverse maternal or fetal outcomes.  There is a pregnancy registry that monitors pregnancy outcomes in pregnant persons exposed to the medication during pregnancy.  It is unknown if this medication is excreted in breast milk.  Do not breastfeed during treatment and for about 4 weeks after the last dose. Cellcept Counseling:  I discussed with the patient the risks of mycophenolate mofetil including but not limited to infection/immunosuppression, GI upset, hypokalemia, hypercholesterolemia, bone marrow suppression, lymphoproliferative disorders, malignancy, GI ulceration/bleed/perforation, colitis, interstitial lung disease, kidney failure, progressive multifocal leukoencephalopathy, and birth defects.  The patient understands that monitoring is required including a baseline creatinine and regular CBC testing. In addition, patient must alert us immediately if symptoms of infection or other concerning signs are noted. Finasteride Female Counseling: Finasteride Counseling:  I discussed with the patient the risks of use of finasteride including but not limited to decreased libido and sexual dysfunction. Explained the teratogenic nature of the medication and stressed the importance of not getting pregnant during treatment. All of the patient's questions and concerns were addressed. Terbinafine Counseling: Patient counseling regarding adverse effects of terbinafine including but not limited to headache, diarrhea, rash, upset stomach, liver function test abnormalities, itching, taste/smell disturbance, nausea, abdominal pain, and flatulence.  There is a rare possibility of liver failure that can occur when taking terbinafine.  The patient understands that a baseline LFT and kidney function test may be required. The patient verbalized understanding of the proper use and possible adverse effects of terbinafine.  All of the patient's questions and concerns were addressed. Benzoyl Peroxide Pregnancy And Lactation Text: This medication is Pregnancy Category C. It is unknown if benzoyl peroxide is excreted in breast milk. Topical Sulfur Applications Pregnancy And Lactation Text: This medication is considered safe during pregnancy and breast feeding secondary to limited systemic absorption. Topical Retinoid counseling:  Patient advised to apply a pea-sized amount only at bedtime and wait 30 minutes after washing their face before applying.  If too drying, patient may add a non-comedogenic moisturizer. The patient verbalized understanding of the proper use and possible adverse effects of retinoids.  All of the patient's questions and concerns were addressed. Niacinamide Pregnancy And Lactation Text: These medications are considered safe during pregnancy. Libtayo Counseling- I discussed with the patient the risks of Libtayo including but not limited to nausea, vomiting, diarrhea, and bone or muscle pain.  The patient verbalized understanding of the proper use and possible adverse effects of Libtayo.  All of the patient's questions and concerns were addressed. Gabapentin Counseling: I discussed with the patient the risks of gabapentin including but not limited to dizziness, somnolence, fatigue and ataxia.